# Patient Record
Sex: MALE | Race: WHITE | Employment: FULL TIME | URBAN - METROPOLITAN AREA
[De-identification: names, ages, dates, MRNs, and addresses within clinical notes are randomized per-mention and may not be internally consistent; named-entity substitution may affect disease eponyms.]

---

## 2023-02-11 ENCOUNTER — APPOINTMENT (OUTPATIENT)
Dept: GENERAL RADIOLOGY | Age: 68
DRG: 481 | End: 2023-02-11
Attending: EMERGENCY MEDICINE
Payer: COMMERCIAL

## 2023-02-11 ENCOUNTER — HOSPITAL ENCOUNTER (INPATIENT)
Age: 68
LOS: 3 days | Discharge: NURSING, REHAB OR LONG-TERM CARE | DRG: 481 | End: 2023-02-15
Attending: STUDENT IN AN ORGANIZED HEALTH CARE EDUCATION/TRAINING PROGRAM | Admitting: INTERNAL MEDICINE
Payer: COMMERCIAL

## 2023-02-11 DIAGNOSIS — S72.002A CLOSED FRACTURE OF LEFT HIP, INITIAL ENCOUNTER (HCC): Primary | ICD-10-CM

## 2023-02-11 LAB
ALBUMIN SERPL-MCNC: 4 G/DL (ref 3.5–5)
ALBUMIN/GLOB SERPL: 1.1 (ref 1.1–2.2)
ALP SERPL-CCNC: 113 U/L (ref 45–117)
ALT SERPL-CCNC: 59 U/L (ref 12–78)
ANION GAP SERPL CALC-SCNC: 7 MMOL/L (ref 5–15)
AST SERPL W P-5'-P-CCNC: 42 U/L (ref 15–37)
BASOPHILS # BLD: 0 K/UL (ref 0–0.1)
BASOPHILS NFR BLD: 1 % (ref 0–1)
BILIRUB SERPL-MCNC: 0.4 MG/DL (ref 0.2–1)
BUN SERPL-MCNC: 12 MG/DL (ref 6–20)
BUN/CREAT SERPL: 10 (ref 12–20)
CA-I BLD-MCNC: 9 MG/DL (ref 8.5–10.1)
CHLORIDE SERPL-SCNC: 106 MMOL/L (ref 97–108)
CO2 SERPL-SCNC: 26 MMOL/L (ref 21–32)
CREAT SERPL-MCNC: 1.19 MG/DL (ref 0.7–1.3)
DIFFERENTIAL METHOD BLD: ABNORMAL
EOSINOPHIL # BLD: 0.2 K/UL (ref 0–0.4)
EOSINOPHIL NFR BLD: 4 % (ref 0–7)
ERYTHROCYTE [DISTWIDTH] IN BLOOD BY AUTOMATED COUNT: 14.8 % (ref 11.5–14.5)
GLOBULIN SER CALC-MCNC: 3.5 G/DL (ref 2–4)
GLUCOSE SERPL-MCNC: 96 MG/DL (ref 65–100)
HCT VFR BLD AUTO: 46.9 % (ref 36.6–50.3)
HGB BLD-MCNC: 15.7 G/DL (ref 12.1–17)
IMM GRANULOCYTES # BLD AUTO: 0 K/UL (ref 0–0.04)
IMM GRANULOCYTES NFR BLD AUTO: 0 % (ref 0–0.5)
INR PPP: 1 (ref 0.9–1.1)
LYMPHOCYTES # BLD: 1.2 K/UL (ref 0.8–3.5)
LYMPHOCYTES NFR BLD: 19 % (ref 12–49)
MCH RBC QN AUTO: 32.6 PG (ref 26–34)
MCHC RBC AUTO-ENTMCNC: 33.5 G/DL (ref 30–36.5)
MCV RBC AUTO: 97.3 FL (ref 80–99)
MONOCYTES # BLD: 0.6 K/UL (ref 0–1)
MONOCYTES NFR BLD: 10 % (ref 5–13)
NEUTS SEG # BLD: 4.1 K/UL (ref 1.8–8)
NEUTS SEG NFR BLD: 66 % (ref 32–75)
NRBC # BLD: 0 K/UL (ref 0–0.01)
NRBC BLD-RTO: 0 PER 100 WBC
PLATELET # BLD AUTO: 204 K/UL (ref 150–400)
PMV BLD AUTO: 9.3 FL (ref 8.9–12.9)
POTASSIUM SERPL-SCNC: 3.9 MMOL/L (ref 3.5–5.1)
PROT SERPL-MCNC: 7.5 G/DL (ref 6.4–8.2)
PROTHROMBIN TIME: 13.6 SEC (ref 11.9–14.6)
RBC # BLD AUTO: 4.82 M/UL (ref 4.1–5.7)
SODIUM SERPL-SCNC: 139 MMOL/L (ref 136–145)
WBC # BLD AUTO: 6.2 K/UL (ref 4.1–11.1)

## 2023-02-11 PROCEDURE — 96361 HYDRATE IV INFUSION ADD-ON: CPT

## 2023-02-11 PROCEDURE — 74011250636 HC RX REV CODE- 250/636: Performed by: STUDENT IN AN ORGANIZED HEALTH CARE EDUCATION/TRAINING PROGRAM

## 2023-02-11 PROCEDURE — 93005 ELECTROCARDIOGRAM TRACING: CPT

## 2023-02-11 PROCEDURE — 85025 COMPLETE CBC W/AUTO DIFF WBC: CPT

## 2023-02-11 PROCEDURE — 73502 X-RAY EXAM HIP UNI 2-3 VIEWS: CPT

## 2023-02-11 PROCEDURE — 96375 TX/PRO/DX INJ NEW DRUG ADDON: CPT

## 2023-02-11 PROCEDURE — 36415 COLL VENOUS BLD VENIPUNCTURE: CPT

## 2023-02-11 PROCEDURE — 85610 PROTHROMBIN TIME: CPT

## 2023-02-11 PROCEDURE — 80053 COMPREHEN METABOLIC PANEL: CPT

## 2023-02-11 PROCEDURE — 99285 EMERGENCY DEPT VISIT HI MDM: CPT

## 2023-02-11 PROCEDURE — 86900 BLOOD TYPING SEROLOGIC ABO: CPT

## 2023-02-11 PROCEDURE — 96374 THER/PROPH/DIAG INJ IV PUSH: CPT

## 2023-02-11 RX ORDER — ONDANSETRON 2 MG/ML
4 INJECTION INTRAMUSCULAR; INTRAVENOUS
Status: COMPLETED | OUTPATIENT
Start: 2023-02-11 | End: 2023-02-11

## 2023-02-11 RX ORDER — FENTANYL CITRATE 50 UG/ML
50 INJECTION, SOLUTION INTRAMUSCULAR; INTRAVENOUS
Status: COMPLETED | OUTPATIENT
Start: 2023-02-11 | End: 2023-02-11

## 2023-02-11 RX ORDER — SODIUM CHLORIDE 9 MG/ML
1000 INJECTION, SOLUTION INTRAVENOUS CONTINUOUS
Status: DISCONTINUED | OUTPATIENT
Start: 2023-02-11 | End: 2023-02-12

## 2023-02-11 RX ORDER — MORPHINE SULFATE 4 MG/ML
4 INJECTION INTRAVENOUS ONCE
Status: COMPLETED | OUTPATIENT
Start: 2023-02-11 | End: 2023-02-11

## 2023-02-11 RX ADMIN — SODIUM CHLORIDE 1000 ML: 9 INJECTION, SOLUTION INTRAVENOUS at 22:16

## 2023-02-11 RX ADMIN — MORPHINE SULFATE 4 MG: 4 INJECTION, SOLUTION INTRAMUSCULAR; INTRAVENOUS at 23:13

## 2023-02-11 RX ADMIN — FENTANYL CITRATE 50 MCG: 0.05 INJECTION, SOLUTION INTRAMUSCULAR; INTRAVENOUS at 22:16

## 2023-02-11 RX ADMIN — ONDANSETRON 4 MG: 2 INJECTION INTRAMUSCULAR; INTRAVENOUS at 23:13

## 2023-02-12 ENCOUNTER — APPOINTMENT (OUTPATIENT)
Dept: GENERAL RADIOLOGY | Age: 68
DRG: 481 | End: 2023-02-12
Attending: ORTHOPAEDIC SURGERY
Payer: COMMERCIAL

## 2023-02-12 ENCOUNTER — APPOINTMENT (OUTPATIENT)
Dept: CT IMAGING | Age: 68
DRG: 481 | End: 2023-02-12
Attending: ORTHOPAEDIC SURGERY
Payer: COMMERCIAL

## 2023-02-12 ENCOUNTER — ANESTHESIA EVENT (OUTPATIENT)
Dept: SURGERY | Age: 68
DRG: 481 | End: 2023-02-12
Payer: COMMERCIAL

## 2023-02-12 ENCOUNTER — ANESTHESIA (OUTPATIENT)
Dept: SURGERY | Age: 68
DRG: 481 | End: 2023-02-12
Payer: COMMERCIAL

## 2023-02-12 PROBLEM — S72.90XA FEMUR FRACTURE (HCC): Status: ACTIVE | Noted: 2023-02-12

## 2023-02-12 LAB
ABO + RH BLD: NORMAL
BLOOD GROUP ANTIBODIES SERPL: NEGATIVE
SPECIMEN EXP DATE BLD: NORMAL

## 2023-02-12 PROCEDURE — 74011000250 HC RX REV CODE- 250: Performed by: ORTHOPAEDIC SURGERY

## 2023-02-12 PROCEDURE — 0QS704Z REPOSITION LEFT UPPER FEMUR WITH INTERNAL FIXATION DEVICE, OPEN APPROACH: ICD-10-PCS | Performed by: ORTHOPAEDIC SURGERY

## 2023-02-12 PROCEDURE — 74011250637 HC RX REV CODE- 250/637: Performed by: ORTHOPAEDIC SURGERY

## 2023-02-12 PROCEDURE — 74011000250 HC RX REV CODE- 250: Performed by: ANESTHESIOLOGY

## 2023-02-12 PROCEDURE — 74011250636 HC RX REV CODE- 250/636: Performed by: NURSE ANESTHETIST, CERTIFIED REGISTERED

## 2023-02-12 PROCEDURE — 77030031139 HC SUT VCRL2 J&J -A: Performed by: ORTHOPAEDIC SURGERY

## 2023-02-12 PROCEDURE — C1713 ANCHOR/SCREW BN/BN,TIS/BN: HCPCS | Performed by: ORTHOPAEDIC SURGERY

## 2023-02-12 PROCEDURE — 72192 CT PELVIS W/O DYE: CPT

## 2023-02-12 PROCEDURE — C1769 GUIDE WIRE: HCPCS | Performed by: ORTHOPAEDIC SURGERY

## 2023-02-12 PROCEDURE — 77030011264 HC ELECTRD BLD EXT COVD -A: Performed by: ORTHOPAEDIC SURGERY

## 2023-02-12 PROCEDURE — 73552 X-RAY EXAM OF FEMUR 2/>: CPT

## 2023-02-12 PROCEDURE — 76210000006 HC OR PH I REC 0.5 TO 1 HR: Performed by: ORTHOPAEDIC SURGERY

## 2023-02-12 PROCEDURE — 74011250637 HC RX REV CODE- 250/637: Performed by: NURSE PRACTITIONER

## 2023-02-12 PROCEDURE — 94640 AIRWAY INHALATION TREATMENT: CPT

## 2023-02-12 PROCEDURE — 2709999900 HC NON-CHARGEABLE SUPPLY: Performed by: ORTHOPAEDIC SURGERY

## 2023-02-12 PROCEDURE — 77030027467 HC RMR IM BIXCT DISP STRY -F: Performed by: ORTHOPAEDIC SURGERY

## 2023-02-12 PROCEDURE — 96375 TX/PRO/DX INJ NEW DRUG ADDON: CPT

## 2023-02-12 PROCEDURE — 76000 FLUOROSCOPY <1 HR PHYS/QHP: CPT

## 2023-02-12 PROCEDURE — 74011000250 HC RX REV CODE- 250: Performed by: INTERNAL MEDICINE

## 2023-02-12 PROCEDURE — 36415 COLL VENOUS BLD VENIPUNCTURE: CPT

## 2023-02-12 PROCEDURE — 74011250636 HC RX REV CODE- 250/636: Performed by: STUDENT IN AN ORGANIZED HEALTH CARE EDUCATION/TRAINING PROGRAM

## 2023-02-12 PROCEDURE — 77030040361 HC SLV COMPR DVT MDII -B: Performed by: ORTHOPAEDIC SURGERY

## 2023-02-12 PROCEDURE — 76010000131 HC OR TIME 2 TO 2.5 HR: Performed by: ORTHOPAEDIC SURGERY

## 2023-02-12 PROCEDURE — 77030016452 HC BIT DRL AO4 STRY -C: Performed by: ORTHOPAEDIC SURGERY

## 2023-02-12 PROCEDURE — 71045 X-RAY EXAM CHEST 1 VIEW: CPT

## 2023-02-12 PROCEDURE — 74011250637 HC RX REV CODE- 250/637: Performed by: INTERNAL MEDICINE

## 2023-02-12 PROCEDURE — 74011250636 HC RX REV CODE- 250/636: Performed by: ORTHOPAEDIC SURGERY

## 2023-02-12 PROCEDURE — 74011250636 HC RX REV CODE- 250/636: Performed by: INTERNAL MEDICINE

## 2023-02-12 PROCEDURE — 74011000250 HC RX REV CODE- 250: Performed by: NURSE ANESTHETIST, CERTIFIED REGISTERED

## 2023-02-12 PROCEDURE — 76060000035 HC ANESTHESIA 2 TO 2.5 HR: Performed by: ORTHOPAEDIC SURGERY

## 2023-02-12 PROCEDURE — 65270000029 HC RM PRIVATE

## 2023-02-12 PROCEDURE — 96376 TX/PRO/DX INJ SAME DRUG ADON: CPT

## 2023-02-12 DEVICE — LOCKING SCREW, FULLY THREADED: Type: IMPLANTABLE DEVICE | Site: FEMUR | Status: FUNCTIONAL

## 2023-02-12 DEVICE — LAG SCREW, TI
Type: IMPLANTABLE DEVICE | Site: FEMUR | Status: FUNCTIONAL
Brand: GAMMA

## 2023-02-12 DEVICE — LONG NAIL KIT R1.5, TI, LEFT
Type: IMPLANTABLE DEVICE | Site: FEMUR | Status: FUNCTIONAL
Brand: GAMMA

## 2023-02-12 RX ORDER — CEFAZOLIN SODIUM 1 G/3ML
INJECTION, POWDER, FOR SOLUTION INTRAMUSCULAR; INTRAVENOUS AS NEEDED
Status: DISCONTINUED | OUTPATIENT
Start: 2023-02-12 | End: 2023-02-12 | Stop reason: HOSPADM

## 2023-02-12 RX ORDER — SODIUM CHLORIDE 0.9 % (FLUSH) 0.9 %
5-40 SYRINGE (ML) INJECTION EVERY 8 HOURS
Status: DISCONTINUED | OUTPATIENT
Start: 2023-02-12 | End: 2023-02-15 | Stop reason: HOSPADM

## 2023-02-12 RX ORDER — SODIUM CHLORIDE 0.9 % (FLUSH) 0.9 %
5-40 SYRINGE (ML) INJECTION AS NEEDED
Status: DISCONTINUED | OUTPATIENT
Start: 2023-02-12 | End: 2023-02-12 | Stop reason: HOSPADM

## 2023-02-12 RX ORDER — MORPHINE SULFATE 2 MG/ML
2 INJECTION, SOLUTION INTRAMUSCULAR; INTRAVENOUS
Status: DISCONTINUED | OUTPATIENT
Start: 2023-02-12 | End: 2023-02-12

## 2023-02-12 RX ORDER — TRAMADOL HYDROCHLORIDE 50 MG/1
50 TABLET ORAL
Status: DISCONTINUED | OUTPATIENT
Start: 2023-02-12 | End: 2023-02-15 | Stop reason: HOSPADM

## 2023-02-12 RX ORDER — BUDESONIDE AND FORMOTEROL FUMARATE DIHYDRATE 160; 4.5 UG/1; UG/1
2 AEROSOL RESPIRATORY (INHALATION)
Status: DISCONTINUED | OUTPATIENT
Start: 2023-02-12 | End: 2023-02-15 | Stop reason: HOSPADM

## 2023-02-12 RX ORDER — DEXMEDETOMIDINE HYDROCHLORIDE 100 UG/ML
INJECTION, SOLUTION INTRAVENOUS AS NEEDED
Status: DISCONTINUED | OUTPATIENT
Start: 2023-02-12 | End: 2023-02-12

## 2023-02-12 RX ORDER — SODIUM CHLORIDE 9 MG/ML
125 INJECTION, SOLUTION INTRAVENOUS CONTINUOUS
Status: DISPENSED | OUTPATIENT
Start: 2023-02-12 | End: 2023-02-13

## 2023-02-12 RX ORDER — FENTANYL CITRATE 50 UG/ML
25 INJECTION, SOLUTION INTRAMUSCULAR; INTRAVENOUS
Status: DISCONTINUED | OUTPATIENT
Start: 2023-02-12 | End: 2023-02-12

## 2023-02-12 RX ORDER — BUPIVACAINE HYDROCHLORIDE 7.5 MG/ML
INJECTION, SOLUTION INTRASPINAL
Status: COMPLETED | OUTPATIENT
Start: 2023-02-12 | End: 2023-02-12

## 2023-02-12 RX ORDER — HYDROMORPHONE HYDROCHLORIDE 1 MG/ML
0.5 INJECTION, SOLUTION INTRAMUSCULAR; INTRAVENOUS; SUBCUTANEOUS
Status: DISCONTINUED | OUTPATIENT
Start: 2023-02-12 | End: 2023-02-12

## 2023-02-12 RX ORDER — PROPOFOL 10 MG/ML
INJECTION, EMULSION INTRAVENOUS
Status: DISCONTINUED | OUTPATIENT
Start: 2023-02-12 | End: 2023-02-12 | Stop reason: HOSPADM

## 2023-02-12 RX ORDER — SODIUM CHLORIDE 0.9 % (FLUSH) 0.9 %
5-40 SYRINGE (ML) INJECTION EVERY 8 HOURS
Status: DISCONTINUED | OUTPATIENT
Start: 2023-02-12 | End: 2023-02-12 | Stop reason: HOSPADM

## 2023-02-12 RX ORDER — FENTANYL CITRATE 50 UG/ML
100 INJECTION, SOLUTION INTRAMUSCULAR; INTRAVENOUS
Status: COMPLETED | OUTPATIENT
Start: 2023-02-12 | End: 2023-02-12

## 2023-02-12 RX ORDER — OXYCODONE HYDROCHLORIDE 5 MG/1
5 TABLET ORAL
Status: DISCONTINUED | OUTPATIENT
Start: 2023-02-12 | End: 2023-02-12

## 2023-02-12 RX ORDER — IPRATROPIUM BROMIDE AND ALBUTEROL SULFATE 2.5; .5 MG/3ML; MG/3ML
3 SOLUTION RESPIRATORY (INHALATION)
Status: DISCONTINUED | OUTPATIENT
Start: 2023-02-12 | End: 2023-02-15 | Stop reason: HOSPADM

## 2023-02-12 RX ORDER — FACIAL-BODY WIPES
10 EACH TOPICAL DAILY PRN
Status: DISCONTINUED | OUTPATIENT
Start: 2023-02-14 | End: 2023-02-15 | Stop reason: HOSPADM

## 2023-02-12 RX ORDER — ONDANSETRON 2 MG/ML
INJECTION INTRAMUSCULAR; INTRAVENOUS AS NEEDED
Status: DISCONTINUED | OUTPATIENT
Start: 2023-02-12 | End: 2023-02-12 | Stop reason: HOSPADM

## 2023-02-12 RX ORDER — ONDANSETRON 2 MG/ML
4 INJECTION INTRAMUSCULAR; INTRAVENOUS
Status: DISCONTINUED | OUTPATIENT
Start: 2023-02-12 | End: 2023-02-15 | Stop reason: HOSPADM

## 2023-02-12 RX ORDER — CELECOXIB 100 MG/1
100 CAPSULE ORAL 2 TIMES DAILY
Status: DISCONTINUED | OUTPATIENT
Start: 2023-02-13 | End: 2023-02-12

## 2023-02-12 RX ORDER — POLYETHYLENE GLYCOL 3350 17 G/17G
17 POWDER, FOR SOLUTION ORAL DAILY
Status: DISCONTINUED | OUTPATIENT
Start: 2023-02-13 | End: 2023-02-15 | Stop reason: HOSPADM

## 2023-02-12 RX ORDER — FENTANYL CITRATE 50 UG/ML
50 INJECTION, SOLUTION INTRAMUSCULAR; INTRAVENOUS AS NEEDED
Status: DISCONTINUED | OUTPATIENT
Start: 2023-02-12 | End: 2023-02-12 | Stop reason: HOSPADM

## 2023-02-12 RX ORDER — ASPIRIN 325 MG
325 TABLET, DELAYED RELEASE (ENTERIC COATED) ORAL 2 TIMES DAILY
Status: DISCONTINUED | OUTPATIENT
Start: 2023-02-12 | End: 2023-02-15 | Stop reason: HOSPADM

## 2023-02-12 RX ORDER — POLYETHYLENE GLYCOL 3350 17 G/17G
17 POWDER, FOR SOLUTION ORAL DAILY PRN
Status: DISCONTINUED | OUTPATIENT
Start: 2023-02-12 | End: 2023-02-15 | Stop reason: HOSPADM

## 2023-02-12 RX ORDER — ACETAMINOPHEN 325 MG/1
650 TABLET ORAL
Status: DISCONTINUED | OUTPATIENT
Start: 2023-02-12 | End: 2023-02-12

## 2023-02-12 RX ORDER — ACETAMINOPHEN 500 MG
1000 TABLET ORAL EVERY 6 HOURS
Status: DISCONTINUED | OUTPATIENT
Start: 2023-02-12 | End: 2023-02-12

## 2023-02-12 RX ORDER — OXYCODONE HYDROCHLORIDE 5 MG/1
2.5 TABLET ORAL
Status: DISCONTINUED | OUTPATIENT
Start: 2023-02-12 | End: 2023-02-15 | Stop reason: HOSPADM

## 2023-02-12 RX ORDER — NORETHINDRONE AND ETHINYL ESTRADIOL 0.5-0.035
5 KIT ORAL AS NEEDED
Status: DISCONTINUED | OUTPATIENT
Start: 2023-02-12 | End: 2023-02-12 | Stop reason: HOSPADM

## 2023-02-12 RX ORDER — MORPHINE SULFATE 4 MG/ML
4 INJECTION INTRAVENOUS
Status: DISPENSED | OUTPATIENT
Start: 2023-02-12 | End: 2023-02-15

## 2023-02-12 RX ORDER — FENTANYL CITRATE 50 UG/ML
INJECTION, SOLUTION INTRAMUSCULAR; INTRAVENOUS AS NEEDED
Status: DISCONTINUED | OUTPATIENT
Start: 2023-02-12 | End: 2023-02-12 | Stop reason: HOSPADM

## 2023-02-12 RX ORDER — LIDOCAINE HYDROCHLORIDE 10 MG/ML
0.1 INJECTION, SOLUTION EPIDURAL; INFILTRATION; INTRACAUDAL; PERINEURAL AS NEEDED
Status: DISCONTINUED | OUTPATIENT
Start: 2023-02-12 | End: 2023-02-12 | Stop reason: HOSPADM

## 2023-02-12 RX ORDER — DEXAMETHASONE SODIUM PHOSPHATE 4 MG/ML
INJECTION, SOLUTION INTRA-ARTICULAR; INTRALESIONAL; INTRAMUSCULAR; INTRAVENOUS; SOFT TISSUE AS NEEDED
Status: DISCONTINUED | OUTPATIENT
Start: 2023-02-12 | End: 2023-02-12 | Stop reason: HOSPADM

## 2023-02-12 RX ORDER — HYDROMORPHONE HYDROCHLORIDE 1 MG/ML
0.5 INJECTION, SOLUTION INTRAMUSCULAR; INTRAVENOUS; SUBCUTANEOUS ONCE
Status: COMPLETED | OUTPATIENT
Start: 2023-02-12 | End: 2023-02-12

## 2023-02-12 RX ORDER — BUDESONIDE, GLYCOPYRROLATE, AND FORMOTEROL FUMARATE 160; 9; 4.8 UG/1; UG/1; UG/1
2 AEROSOL, METERED RESPIRATORY (INHALATION) 2 TIMES DAILY
COMMUNITY
Start: 2022-08-10

## 2023-02-12 RX ORDER — OXYCODONE HYDROCHLORIDE 10 MG/1
10 TABLET ORAL
Status: DISCONTINUED | OUTPATIENT
Start: 2023-02-12 | End: 2023-02-12

## 2023-02-12 RX ORDER — SODIUM CHLORIDE 0.9 % (FLUSH) 0.9 %
5-40 SYRINGE (ML) INJECTION AS NEEDED
Status: DISCONTINUED | OUTPATIENT
Start: 2023-02-12 | End: 2023-02-15 | Stop reason: HOSPADM

## 2023-02-12 RX ORDER — CELECOXIB 200 MG/1
200 CAPSULE ORAL 2 TIMES DAILY
Status: COMPLETED | OUTPATIENT
Start: 2023-02-12 | End: 2023-02-14

## 2023-02-12 RX ORDER — SODIUM CHLORIDE 0.9 % (FLUSH) 0.9 %
5-40 SYRINGE (ML) INJECTION AS NEEDED
Status: DISCONTINUED | OUTPATIENT
Start: 2023-02-12 | End: 2023-02-12

## 2023-02-12 RX ORDER — ONDANSETRON 4 MG/1
4 TABLET, ORALLY DISINTEGRATING ORAL
Status: DISCONTINUED | OUTPATIENT
Start: 2023-02-12 | End: 2023-02-15 | Stop reason: HOSPADM

## 2023-02-12 RX ORDER — DIPHENHYDRAMINE HYDROCHLORIDE 50 MG/ML
12.5 INJECTION, SOLUTION INTRAMUSCULAR; INTRAVENOUS AS NEEDED
Status: DISCONTINUED | OUTPATIENT
Start: 2023-02-12 | End: 2023-02-12 | Stop reason: HOSPADM

## 2023-02-12 RX ORDER — MIDAZOLAM HYDROCHLORIDE 1 MG/ML
INJECTION, SOLUTION INTRAMUSCULAR; INTRAVENOUS AS NEEDED
Status: DISCONTINUED | OUTPATIENT
Start: 2023-02-12 | End: 2023-02-12 | Stop reason: HOSPADM

## 2023-02-12 RX ORDER — ACETAMINOPHEN 650 MG/1
650 SUPPOSITORY RECTAL
Status: DISCONTINUED | OUTPATIENT
Start: 2023-02-12 | End: 2023-02-12

## 2023-02-12 RX ORDER — SODIUM CHLORIDE 0.9 % (FLUSH) 0.9 %
5-40 SYRINGE (ML) INJECTION EVERY 8 HOURS
Status: DISCONTINUED | OUTPATIENT
Start: 2023-02-12 | End: 2023-02-12

## 2023-02-12 RX ORDER — MORPHINE SULFATE 2 MG/ML
1 INJECTION, SOLUTION INTRAMUSCULAR; INTRAVENOUS
Status: ACTIVE | OUTPATIENT
Start: 2023-02-12 | End: 2023-02-13

## 2023-02-12 RX ORDER — SODIUM CHLORIDE 9 MG/ML
100 INJECTION, SOLUTION INTRAVENOUS CONTINUOUS
Status: DISCONTINUED | OUTPATIENT
Start: 2023-02-12 | End: 2023-02-12

## 2023-02-12 RX ORDER — ACETAMINOPHEN 500 MG
1000 TABLET ORAL EVERY 6 HOURS
Status: DISCONTINUED | OUTPATIENT
Start: 2023-02-12 | End: 2023-02-15 | Stop reason: HOSPADM

## 2023-02-12 RX ORDER — HYDROMORPHONE HYDROCHLORIDE 1 MG/ML
0.5 INJECTION, SOLUTION INTRAMUSCULAR; INTRAVENOUS; SUBCUTANEOUS
Status: DISCONTINUED | OUTPATIENT
Start: 2023-02-12 | End: 2023-02-12 | Stop reason: HOSPADM

## 2023-02-12 RX ORDER — FENTANYL CITRATE 50 UG/ML
50 INJECTION, SOLUTION INTRAMUSCULAR; INTRAVENOUS
Status: DISCONTINUED | OUTPATIENT
Start: 2023-02-12 | End: 2023-02-12

## 2023-02-12 RX ORDER — AMOXICILLIN 250 MG
1 CAPSULE ORAL 2 TIMES DAILY
Status: DISCONTINUED | OUTPATIENT
Start: 2023-02-12 | End: 2023-02-15 | Stop reason: HOSPADM

## 2023-02-12 RX ORDER — FENTANYL CITRATE 50 UG/ML
50 INJECTION, SOLUTION INTRAMUSCULAR; INTRAVENOUS
Status: DISCONTINUED | OUTPATIENT
Start: 2023-02-12 | End: 2023-02-12 | Stop reason: HOSPADM

## 2023-02-12 RX ORDER — NALOXONE HYDROCHLORIDE 0.4 MG/ML
0.4 INJECTION, SOLUTION INTRAMUSCULAR; INTRAVENOUS; SUBCUTANEOUS AS NEEDED
Status: DISCONTINUED | OUTPATIENT
Start: 2023-02-12 | End: 2023-02-15 | Stop reason: HOSPADM

## 2023-02-12 RX ORDER — ONDANSETRON 2 MG/ML
4 INJECTION INTRAMUSCULAR; INTRAVENOUS AS NEEDED
Status: DISCONTINUED | OUTPATIENT
Start: 2023-02-12 | End: 2023-02-12 | Stop reason: HOSPADM

## 2023-02-12 RX ORDER — SODIUM CHLORIDE, SODIUM LACTATE, POTASSIUM CHLORIDE, CALCIUM CHLORIDE 600; 310; 30; 20 MG/100ML; MG/100ML; MG/100ML; MG/100ML
INJECTION, SOLUTION INTRAVENOUS
Status: DISCONTINUED | OUTPATIENT
Start: 2023-02-12 | End: 2023-02-12 | Stop reason: HOSPADM

## 2023-02-12 RX ORDER — ONDANSETRON 2 MG/ML
4 INJECTION INTRAMUSCULAR; INTRAVENOUS
Status: ACTIVE | OUTPATIENT
Start: 2023-02-12 | End: 2023-02-13

## 2023-02-12 RX ADMIN — CELECOXIB 200 MG: 200 CAPSULE ORAL at 21:02

## 2023-02-12 RX ADMIN — PROPOFOL 100 MCG/KG/MIN: 10 INJECTION, EMULSION INTRAVENOUS at 15:30

## 2023-02-12 RX ADMIN — MORPHINE SULFATE 4 MG: 4 INJECTION, SOLUTION INTRAMUSCULAR; INTRAVENOUS at 11:48

## 2023-02-12 RX ADMIN — HYDROMORPHONE HYDROCHLORIDE 0.5 MG: 1 INJECTION, SOLUTION INTRAMUSCULAR; INTRAVENOUS; SUBCUTANEOUS at 03:09

## 2023-02-12 RX ADMIN — IPRATROPIUM BROMIDE AND ALBUTEROL SULFATE 3 ML: 2.5; .5 SOLUTION RESPIRATORY (INHALATION) at 09:39

## 2023-02-12 RX ADMIN — FENTANYL CITRATE 100 MCG: 50 INJECTION, SOLUTION INTRAMUSCULAR; INTRAVENOUS at 00:50

## 2023-02-12 RX ADMIN — ONDANSETRON 4 MG: 2 INJECTION INTRAMUSCULAR; INTRAVENOUS at 16:05

## 2023-02-12 RX ADMIN — SODIUM CHLORIDE, PRESERVATIVE FREE 10 ML: 5 INJECTION INTRAVENOUS at 08:31

## 2023-02-12 RX ADMIN — ACETAMINOPHEN 1000 MG: 500 TABLET ORAL at 20:02

## 2023-02-12 RX ADMIN — SODIUM CHLORIDE, PRESERVATIVE FREE 10 ML: 5 INJECTION INTRAVENOUS at 22:15

## 2023-02-12 RX ADMIN — OXYCODONE 2.5 MG: 5 TABLET ORAL at 21:02

## 2023-02-12 RX ADMIN — SENNOSIDES AND DOCUSATE SODIUM 1 TABLET: 50; 8.6 TABLET ORAL at 21:02

## 2023-02-12 RX ADMIN — CEFAZOLIN SODIUM 2 G: 1 INJECTION, POWDER, FOR SOLUTION INTRAMUSCULAR; INTRAVENOUS at 15:46

## 2023-02-12 RX ADMIN — PROPOFOL 30 MG: 10 INJECTION, EMULSION INTRAVENOUS at 15:20

## 2023-02-12 RX ADMIN — SODIUM CHLORIDE 50 MCG/MIN: 9 INJECTION, SOLUTION INTRAVENOUS at 16:02

## 2023-02-12 RX ADMIN — BUDESONIDE AND FORMOTEROL FUMARATE DIHYDRATE 2 PUFF: 160; 4.5 AEROSOL RESPIRATORY (INHALATION) at 19:41

## 2023-02-12 RX ADMIN — ACETAMINOPHEN 1000 MG: 500 TABLET ORAL at 11:48

## 2023-02-12 RX ADMIN — MIDAZOLAM HYDROCHLORIDE 2 MG: 2 INJECTION, SOLUTION INTRAMUSCULAR; INTRAVENOUS at 15:12

## 2023-02-12 RX ADMIN — ASPIRIN 325 MG: 325 TABLET, COATED ORAL at 21:01

## 2023-02-12 RX ADMIN — SODIUM CHLORIDE 125 ML/HR: 9 INJECTION, SOLUTION INTRAVENOUS at 19:56

## 2023-02-12 RX ADMIN — SODIUM CHLORIDE, POTASSIUM CHLORIDE, SODIUM LACTATE AND CALCIUM CHLORIDE: 600; 310; 30; 20 INJECTION, SOLUTION INTRAVENOUS at 15:12

## 2023-02-12 RX ADMIN — BUDESONIDE AND FORMOTEROL FUMARATE DIHYDRATE 2 PUFF: 160; 4.5 AEROSOL RESPIRATORY (INHALATION) at 09:42

## 2023-02-12 RX ADMIN — FENTANYL CITRATE 50 MCG: 50 INJECTION INTRAMUSCULAR; INTRAVENOUS at 05:08

## 2023-02-12 RX ADMIN — ACETAMINOPHEN 1000 MG: 500 TABLET ORAL at 23:26

## 2023-02-12 RX ADMIN — BUPIVACAINE HYDROCHLORIDE 15 MG: 7.5 INJECTION, SOLUTION INTRASPINAL at 15:20

## 2023-02-12 RX ADMIN — SODIUM CHLORIDE 100 ML/HR: 9 INJECTION, SOLUTION INTRAVENOUS at 00:50

## 2023-02-12 RX ADMIN — PHENYLEPHRINE HYDROCHLORIDE 100 MCG: 10 INJECTION INTRAVENOUS at 15:57

## 2023-02-12 RX ADMIN — FENTANYL CITRATE 50 MCG: 50 INJECTION, SOLUTION INTRAMUSCULAR; INTRAVENOUS at 15:12

## 2023-02-12 RX ADMIN — SODIUM CHLORIDE, PRESERVATIVE FREE 10 ML: 5 INJECTION INTRAVENOUS at 05:45

## 2023-02-12 RX ADMIN — OXYCODONE HYDROCHLORIDE 10 MG: 10 TABLET ORAL at 09:17

## 2023-02-12 RX ADMIN — CELECOXIB 200 MG: 200 CAPSULE ORAL at 10:00

## 2023-02-12 RX ADMIN — DEXAMETHASONE SODIUM PHOSPHATE 4 MG: 4 INJECTION, SOLUTION INTRA-ARTICULAR; INTRALESIONAL; INTRAMUSCULAR; INTRAVENOUS; SOFT TISSUE at 16:05

## 2023-02-12 RX ADMIN — MORPHINE SULFATE 4 MG: 4 INJECTION, SOLUTION INTRAMUSCULAR; INTRAVENOUS at 08:29

## 2023-02-12 RX ADMIN — SODIUM CHLORIDE, PRESERVATIVE FREE 10 ML: 5 INJECTION INTRAVENOUS at 19:58

## 2023-02-12 RX ADMIN — FENTANYL CITRATE 50 MCG: 50 INJECTION, SOLUTION INTRAMUSCULAR; INTRAVENOUS at 15:19

## 2023-02-12 NOTE — ED PROVIDER NOTES
[id number]  EMERGENCY DEPARTMENT ENCOUNTER NOTE    Date: 2/11/2023  Patient Name: Mare Pennington    History of Presenting Illness     Chief Complaint   Patient presents with    Fall    Hip Pain       History obtained from: Patient    HPI: Mare Pennington, 74 y.o. male with no known past medical history or medications presents for follow-up. The patient was walking in his house when he slipped and fell sideways landing on his left hip without any head trauma. No loss of consciousness. He has localized pain over the left hip with swelling, deformity, and a shortened and externally rotated lower extremity. No history of fractures. He has severe 10/10 pain over the left hip area. No numbness or weakness in the left lower extremity. Has full range of motion of the knee and ankle with no change in skin color, no cyanosis, no pale extremity, and normal temperature and perfusion. On arrival, he had normal pulses in the lower extremities. Medical History   I reviewed the medical, surgical, family, and social history, as well as allergies:    PCP: Arely Mark MD    Past Medical History:  No past medical history on file. Past Surgical History:  No past surgical history on file. Current Outpatient Medications:  No current outpatient medications   Family History:  No family history on file. Social History: Allergies:  No Known Allergies    Review of Systems     Review of Systems  Negative: Positives and pertinent negatives as per HPI. All other systems were reviewed and are negative. Physical Exam & Vital Signs   Vital Signs - I reviewed the patient's vital signs.     Patient Vitals for the past 12 hrs:   Temp Pulse Resp BP SpO2   02/12/23 0158 -- 100 15 121/68 97 %   02/12/23 0131 -- 96 17 123/81 95 %   02/12/23 0030 -- 90 20 113/69 96 %   02/11/23 2332 -- 78 22 119/77 97 %   02/11/23 2230 -- 90 18 113/74 97 %   02/11/23 2206 -- -- -- -- 90 %   02/11/23 2157 97.8 °F (36.6 °C) 81 20 (!) 90/53 --     Physical Exam:    GENERAL: awake, alert, cooperative, not in distress  HEENT:  * Pupils equal, EOMI  * Head atraumatic  CV:  * audible heart sounds  * warm and perfused extremities bilaterally  PULMONARY: Good air movement, no wheezes, no crackles  ABDOMEN/: soft, no distension, no guarding, no abdominal tenderness  EXTREMITIES/BACK: warm and perfused noted left-sided hematoma over the proximal lateral thigh with tenderness over the hip with a shortened and externally rotated left lower extremity with intact DP and PT pulses, normal cap refill, and normal temperature with no tenderness over the distal femur, knee, leg, or ankle/foot. Normal sensation all dermatomes. No overlying lacerations or abrasions. SKIN: no rashes or signs of trauma  NEURO:  * Speech clear  * Moves U&LE to command    Medical Decision Making     Patient is a 74 y.o. male presenting for fall. Vitals reveal no significant abnormalities and physical exam reveals  L hip deformity with normal neurovasc exam . EKG showed no significant abnormalities. Based on the history, physical exam, risk factors, and vital signs, differential includes: Hip fracture, dislocation. No evidence of neurovascular compromise. The patient will be given pain medicines, preop labs sent, will be kept NPO. We will start IV fluid maintenance. We will contact Ortho. Will admit. .    See ED Course and Reassessment for evaluation and discussion. Consultant Discussions/Recs: None  Records Reviewed: Nursing Notes  Social Determinants of health affecting management: None    ED Course & Reassessment     ED Course:     ED Course as of 02/12/23 0259   Sat Feb 11, 2023   2303 CBC does not show any evidence of acute process. Leukocytosis not present to suggest infection. Hemoglobin not suggestive of acute anemia. No significant electrolyte derangements. Creatinine is not elevated more than baseline range making TETO unlikely. No significant transaminitis noted. Normal bilirubin. INR and PT are within normal limits. [SS]   2321 XR: Comminuted and displaced intertrochanteric fracture. [SS]   Sun Feb 12, 2023   0038 Orthopedics contacted. [SS]      ED Course User Index  [SS] Lubna Rashid MD       Reassessment:    Will admit. Diagnosis     Clinical Impression:   1. Closed fracture of left hip, initial encounter [facility])        Final Disposition     Admission: [facility]    After completion of ED workup and discussion of results and diagnoses, patient was admitted to the hospital. Case was discussed with admitting provider. Procedures, Critical Care, & Clinical Tools   Performed by: Isiah Cruz MD  Procedures     EKG interpretation (Preliminary interpretation by me):  Rhythm: normal sinus rhythm; and regular . Rate (approx.): 81. Axis: normal;  CA interval: normal;  QRS interval: normal ;  ST/T wave: normal;  Other findings: normal.      Results, Consults, Medications     Consults:  IP CONSULT TO ORTHOPEDIC SURGERY   Labs:  Recent Results (from the past 12 hour(s))   PROTHROMBIN TIME + INR    Collection Time: 02/11/23 10:13 PM   Result Value Ref Range    Prothrombin time 13.6 11.9 - 14.6 sec    INR 1.0 0.9 - 1.1     CBC WITH AUTOMATED DIFF    Collection Time: 02/11/23 10:13 PM   Result Value Ref Range    WBC 6.2 4.1 - 11.1 K/uL    RBC 4.82 4.10 - 5.70 M/uL    HGB 15.7 12.1 - 17.0 g/dL    HCT 46.9 36.6 - 50.3 %    MCV 97.3 80.0 - 99.0 FL    MCH 32.6 26.0 - 34.0 PG    MCHC 33.5 30.0 - 36.5 g/dL    RDW 14.8 (H) 11.5 - 14.5 %    PLATELET 961 858 - 585 K/uL    MPV 9.3 8.9 - 12.9 FL    NRBC 0.0 0.0  WBC    ABSOLUTE NRBC 0.00 0.00 - 0.01 K/uL    NEUTROPHILS 66 32 - 75 %    LYMPHOCYTES 19 12 - 49 %    MONOCYTES 10 5 - 13 %    EOSINOPHILS 4 0 - 7 %    BASOPHILS 1 0 - 1 %    IMMATURE GRANULOCYTES 0 0 - 0.5 %    ABS. NEUTROPHILS 4.1 1.8 - 8.0 K/UL    ABS. LYMPHOCYTES 1.2 0.8 - 3.5 K/UL    ABS.  MONOCYTES 0.6 0.0 - 1.0 K/UL ABS. EOSINOPHILS 0.2 0.0 - 0.4 K/UL    ABS. BASOPHILS 0.0 0.0 - 0.1 K/UL    ABS. IMM. GRANS. 0.0 0.00 - 0.04 K/UL    DF AUTOMATED     METABOLIC PANEL, COMPREHENSIVE    Collection Time: 02/11/23 10:13 PM   Result Value Ref Range    Sodium 139 136 - 145 mmol/L    Potassium 3.9 3.5 - 5.1 mmol/L    Chloride 106 97 - 108 mmol/L    CO2 26 21 - 32 mmol/L    Anion gap 7 5 - 15 mmol/L    Glucose 96 65 - 100 mg/dL    BUN 12 6 - 20 mg/dL    Creatinine 1.19 0.70 - 1.30 mg/dL    BUN/Creatinine ratio 10 (L) 12 - 20      eGFR >60 >60 ml/min/1.73m2    Calcium 9.0 8.5 - 10.1 mg/dL    Bilirubin, total 0.4 0.2 - 1.0 mg/dL    AST (SGOT) 42 (H) 15 - 37 U/L    ALT (SGPT) 59 12 - 78 U/L    Alk. phosphatase 113 45 - 117 U/L    Protein, total 7.5 6.4 - 8.2 g/dL    Albumin 4.0 3.5 - 5.0 g/dL    Globulin 3.5 2.0 - 4.0 g/dL    A-G Ratio 1.1 1.1 - 2.2     TYPE & SCREEN    Collection Time: 02/11/23 11:16 PM   Result Value Ref Range    Crossmatch Expiration 02/14/2023,2359     ABO/Rh(D) A Negative     Antibody screen Negative      Radiologic Studies:  CT Results  (Last 48 hours)      None          CXR Results  (Last 48 hours)      None          Medications ordered:  Medications   0.9% sodium chloride infusion 1,000 mL (0 mL IntraVENous IV Completed 2/12/23 0055)   0.9% sodium chloride infusion (100 mL/hr IntraVENous New Bag 2/12/23 0050)   HYDROmorphone (DILAUDID) syringe 0.5 mg (has no administration in time range)   fentaNYL citrate (PF) injection 50 mcg (50 mcg IntraVENous Given 2/11/23 2216)   morphine injection 4 mg (4 mg IntraVENous Given 2/11/23 2313)   ondansetron (ZOFRAN) injection 4 mg (4 mg IntraVENous Given 2/11/23 2313)   fentaNYL citrate (PF) injection 100 mcg (100 mcg IntraVENous Given 2/12/23 0050)       Documentation Comments   - I am the first and primary provider for this patient and am the primary provider of record. - Initial assessment performed.  The patients presenting problems have been discussed, and the staff are in agreement with the care plan formulated and outlined with them. I have encouraged them to ask questions as they arise throughout their visit. - Available medical records, nursing notes, old EKGs, and EMS run sheets (if patient was EMS transported) were reviewed    Please note that this dictation was completed with [employer], the computer voice recognition software. Quite often unanticipated grammatical, syntax, homophones, and other interpretive errors are inadvertently transcribed by the computer software. Please disregard these errors. Please excuse any errors that have escaped final proofreading.

## 2023-02-12 NOTE — OP NOTES
Operative Note    Patient: Ren Padilla  YOB: 1955  MRN: [id number]    Date of Procedure: 2/12/2023     Pre-Op Diagnosis: 1)  Intertrochanteric fracture LEFT hip, transverse, comminuted with greater trochanteric and posterior comminution  2) COPD  3) history of left hemiplegia, with residual left lower extremity monoparesis    Post-Op Diagnosis: Same as preoperative diagnosis. Procedure(s):  OPEN REDUCTION INTERNAL FIXATION OF LEFT HIP    Surgeon(s):  Alvino Beckford MD    Surgical Assistant: Surg Asst-1: John Evans, and April    Anesthesia: Spinal by Dr. Fátima Neal and iJnny Elam    Estimated Blood Loss (mL):  139 ml    Complications: None    Specimens: * No specimens in log *     Implants:   From [employer] gamma nail system  1) 130° x 11 mm x 440 mm gamma nail  2) 110 mm sliding hip screw  3) setscrew placed in dynamic mode  4) Two static distal locking screws (50 mm and 47.5 mm)    Implant Name Type Inv. Item Serial No.  Lot No. LRB No. Used Action   NAIL IM L440MM OD11MM 130DEG LNG TI L FEM R1.5 GAMMA3 - SNA  NAIL IM L440MM OD11MM 130DEG LNG TI L FEM R1.5 GAMMA3 NA [email or web address] [id number] Left 1 Implanted   SCR BNE LAG GAMMA 3 [id number] -- TI STRL - SNA  SCR BNE LAG GAMMA 3 [id number] -- TI STRL NA [email or web address] [id number] Left 1 Implanted   SCR BNE LCK T2 FT 5X50MM TI -- STRL - SNA  SCR BNE LCK T2 FT 5X50MM TI -- STRL NA [email or web address] [id number] Left 1 Implanted   SCR BNE LCK T2 FT 5X47. 5MM TI -- STRL - SNA  SCR BNE LCK T2 FT 5X47. 5MM TI -- STRL NA [email or web address] [id number] Left 1 Implanted     Drains: * No LDAs found *    Findings: Stable near anatomic reduction obtained, with satisfactory hardware placement and fixation. Detailed Description of Procedure: The patient and operative site were identified in the preoperative holding area. After induction of anesthesia the patient was positioned supine on the fracture table. Intravenous cefazolin were administered. Fluoroscopy was positioned appropriately. Fracture reduction was obtained with traction, and manipulation with rotation. Satisfactory alignment was noted. Standard prepping and draping of the LEFT hip was performed into a surgical field. After timeout was called, I used biplanar fluoroscopy to localize the tip of the greater trochanter, and made a short incision proximal and lateral to it. The deep fascia was divided in line with the incision. The greater trochanter was localized. A cannulated trochanteric awl was now introduced into the proximal femur biplanar fluoroscopic assistance, and accurately optimizing the entry point. The guidewire was inserted into the femoral canal, and the end was confirmed to be at the distal femoral physis healed scar. The required length of the intramedullary long gamma nail was estimated at 460 mm. However, the longest nail carried routinely by the [facility] team is 440 mm, and we chose to use that implant. A one-step 15 mm reamer was passed to the lesser trochanter. Flexible intramedullary reaming was now performed over the intramedullary guidewire. The final reamer was 12.5 mm, allowing the use of an 11 mm nail. The chosen intramedullary device was now passed over the guidewire and fine-tuned in position. The long intramedullary guidewire was removed. The short tip threaded guidewire was inserted into the femoral head, and accurate positioning was performed. Cannulated reaming was performed, followed by insertion of the 110 mm sliding screw. The traction was released. The setscrew was now inserted in the dynamic mode. The insertion handle was disengaged and removed. This was followed by standard distal static locking of the implant with a freehand technique, using two screws. Fluoroscopic images were obtained and saved, confirming near anatomic fracture reduction as well as satisfactory implant placement.   The wound was thoroughly irrigated and closure was performed in layers. Subcutaneous and skin closure was performed by first assistant Ms. Anderson, along with infiltration of soft tissues with local anesthetic. Sterile dressings were applied. The patient tolerated the procedure well and was transferred to the recovery room in stable condition. Postoperatively, the patient will be allowed weightbearing as tolerated, with a standard hip fracture rehabilitation protocol.     Electronically Signed by Zakiya Solomon MD on 2/12/2023 at 5:11 PM

## 2023-02-12 NOTE — ANESTHESIA PREPROCEDURE EVALUATION
Relevant Problems   No relevant active problems       Anesthetic History   No history of anesthetic complications            Review of Systems / Medical History  Patient summary reviewed, nursing notes reviewed and pertinent labs reviewed    Pulmonary    COPD            Comments: Home O2 @ 2 l/m at night. Snoring. Neuro/Psych              Cardiovascular  Within defined limits                Exercise tolerance: >4 METS     GI/Hepatic/Renal  Within defined limits              Endo/Other             Other Findings   Comments: X-RAY (2-11-23) IMPRESSION   Comminuted and displaced left intertrochanteric fracture. Physical Exam    Airway  Mallampati: I  TM Distance: > 6 cm  Neck ROM: normal range of motion   Mouth opening: Normal    Comments: Iraheta. Cardiovascular    Rhythm: regular  Rate: normal         Dental      Comments: No teeth in lower jaw.     Pulmonary  Breath sounds clear to auscultation               Abdominal  GI exam deferred       Other Findings            Anesthetic Plan    ASA: 3, emergent  Anesthesia type: spinal    Monitoring Plan: Continuous noninvasive hemodynamic monitoring        Anesthetic plan and risks discussed with: Patient and Family

## 2023-02-12 NOTE — PROGRESS NOTES
Pt refusing full skin assessment at this time due to LLE injury, Pt denies any skin injuries on backside.

## 2023-02-12 NOTE — ANESTHESIA POSTPROCEDURE EVALUATION
Procedure(s):  OPEN REDUCTION INTERNAL FIXATION OF LEFT HIP.    spinal    Anesthesia Post Evaluation        Patient location during evaluation: PACU  Patient participation: complete - patient participated  Level of consciousness: awake  Pain score: 0  Pain management: adequate  Airway patency: patent  Anesthetic complications: no  Cardiovascular status: acceptable  Respiratory status: acceptable  Hydration status: acceptable  Post anesthesia nausea and vomiting:  controlled  Final Post Anesthesia Temperature Assessment:  Normothermia (36.0-37.5 degrees C)      INITIAL Post-op Vital signs:   Vitals Value Taken Time   /69 02/12/23 1800   Temp 36.6 °C (97.8 °F) 02/12/23 1800   Pulse 102 02/12/23 1800   Resp 18 02/12/23 1800   SpO2 99 % 02/12/23 1800

## 2023-02-12 NOTE — CONSULTS
Consult Date: 2/12/2023    IP CONSULT TO ORTHOPEDIC SURGERY  Consult performed by: Breezy Jones MD  Consult ordered by: Elliott Sales MD  Reason for consult: Left hip fracture  Assessment/Recommendations:     IMPRESSION:    1) Complex comminuted transverse intertrochanteric fracture of left proximal femur  2) Chronic left lower extremity monoparesis  3) COPD  4) Remote history of left hemiplegia    RECOMMENDATIONS/PLAN:    I had a detailed discussion with the patient about the nature of his left hip injury, its natural history and treatment options. I have recommended and offered open repair of the proximal femur fracture with internal fixation, with the goal of expediting early mobility and rehabilitation, and mitigating the complications of prolonged immobility and recumbency. Potential risks of the injury and its treatment were discussed and acknowledged, including but not limited to infection, neurovascular injury, blood loss and transfusions, thromboembolism, leg length discrepancy, delayed union, nonunion, malunion, and possible need for further surgery in the future, among others. The patient appeared to understand the issues discussed and wishes to proceed with the recommended treatment plan. Surgery has tentatively been scheduled for today, pending or availability and medical clearance. Karma Ferrera, 74 y.o. male with no known past medical history or medications presents for follow-up. The patient was walking in his house when he slipped and fell sideways landing on his left hip without any head trauma. No loss of consciousness. He has localized pain over the left hip with swelling, deformity, and a shortened and externally rotated lower extremity. No history of fractures. He has severe 10/10 pain over the left hip area. No numbness or weakness in the left lower extremity.   Has full range of motion of the knee and ankle with no change in skin color, no cyanosis, no pale extremity, and normal temperature and perfusion. On arrival, he had normal pulses in the lower extremities. ED evaluation confirmed isolated orthopedic injury in the form of a left peritrochanteric hip fracture. The patient was admitted for medical evaluation and clearance, and subsequent definitive orthopedic management. On my interview and examination, the patient was a good historian, and corroborated elements of history as noted above. He confirmed that he has a history of left hemiplegia several years ago, related to some form of intracranial surgery. He showed good functional recovery over the last years, but continues to have poor coordination and lack of control in his left lower extremity. No past medical history on file. No past surgical history on file. No family history on file.    Social History     Tobacco Use    Smoking status: Not on file    Smokeless tobacco: Not on file   Substance Use Topics    Alcohol use: Not on file       Current Facility-Administered Medications   Medication Dose Route Frequency Provider Last Rate Last Admin    0.9% sodium chloride infusion  100 mL/hr IntraVENous CONTINUOUS Durenda HendersonKeyana  mL/hr at 02/12/23 0050 100 mL/hr at 02/12/23 0050    sodium chloride (NS) flush 5-40 mL  5-40 mL IntraVENous Q8H Keyana No MD   10 mL at 02/12/23 0831    sodium chloride (NS) flush 5-40 mL  5-40 mL IntraVENous PRN Elliott Sales MD        polyethylene glycol (MIRALAX) packet 17 g  17 g Oral DAILY PRN Elliott Sales MD        ondansetron (ZOFRAN ODT) tablet 4 mg  4 mg Oral Q8H PRN Elliott Sales MD        Or    ondansetron (ZOFRAN) injection 4 mg  4 mg IntraVENous Q6H PRN Elliott Sales MD        albuterol-ipratropium (DUO-NEB) 2.5 MG-0.5 MG/3 ML  3 mL Nebulization Q6H PRN Elliott Sales MD   3 mL at 02/12/23 0939    budesonide-formoteroL (SYMBICORT) 160-4.5 mcg/actuation HFA inhaler 2 Puff  2 Puff Inhalation BID RT Elliott Sales MD   2 Puff at 02/12/23 0789 tiotropium bromide (SPIRIVA RESPIMAT) 2.5 mcg /actuation  2 Puff Inhalation DAILY Raj No MD        acetaminophen (TYLENOL) tablet 1,000 mg  1,000 mg Oral Q6H Jenn Balderas MD   1,000 mg at 02/12/23 1148    celecoxib (CELEBREX) capsule 200 mg  200 mg Oral BID Jenn Balderas MD   200 mg at 02/12/23 1000    oxyCODONE IR (ROXICODONE) tablet 5 mg  5 mg Oral Q4H PRN Jenn Balderas MD        morphine injection 4 mg  4 mg IntraVENous Q3H PRN Jenn Balderas MD   4 mg at 02/12/23 1148    morphine injection 2 mg  2 mg IntraVENous Q4H PRN Martin Craze, NP        oxyCODONE IR (ROXICODONE) tablet 5 mg  5 mg Oral Q4H PRN Martin Craze, NP        oxyCODONE IR (ROXICODONE) tablet 10 mg  10 mg Oral Q4H PRN Martin Craze, NP   10 mg at 02/12/23 0917    0.9% sodium chloride infusion 1,000 mL  1,000 mL IntraVENous CONTINUOUS Serena Moreno MD   IV Completed at 02/12/23 0055        Review of Systems  Negative: Positives and pertinent negatives as per HPI. All other systems were reviewed and are negative. Objective     Vital signs for last 24 hours:  Visit Vitals  /82 (BP 1 Location: Left upper arm, BP Patient Position: At rest)   Pulse 80   Temp 98.5 °F (36.9 °C)   Resp 22   Ht 6' 4\" (1.93 m)   Wt 74.8 kg (165 lb)   SpO2 90%   BMI 20.08 kg/m²       Intake/Output this shift:  Current Shift: No intake/output data recorded. Last 3 Shifts: 02/10 1901 - 02/12 0700  In: 1000 [I.V.:1000]  Out: -     Physical Exam:   General: alert, cooperative, moderate distress  Eye: conjunctivae/corneas clear. PERRL, EOM's intact. Throat and Neck: normal and no erythema or exudates noted. No mass   Lung: clear to auscultation bilaterally  Heart: regular rate and rhythm,   Abdomen: soft, non-tender. Bowel sounds normal. No masses,  Extremities: No LE edema. Left lower extremity is in external rotation and shortening. Moderate swelling noted in the proximal left thigh and hip region.   Skin was intact. Distally, dorsalis pedis was palpable. Ankle and toe extensors and flexors were grossly functioning. Skin: Normal.  Neurologic: AOx3. Motor function and sensation grossly intact. Psychiatric: non focal    Data Review:   Recent Results (from the past 24 hour(s))   PROTHROMBIN TIME + INR    Collection Time: 02/11/23 10:13 PM   Result Value Ref Range    Prothrombin time 13.6 11.9 - 14.6 sec    INR 1.0 0.9 - 1.1     CBC WITH AUTOMATED DIFF    Collection Time: 02/11/23 10:13 PM   Result Value Ref Range    WBC 6.2 4.1 - 11.1 K/uL    RBC 4.82 4.10 - 5.70 M/uL    HGB 15.7 12.1 - 17.0 g/dL    HCT 46.9 36.6 - 50.3 %    MCV 97.3 80.0 - 99.0 FL    MCH 32.6 26.0 - 34.0 PG    MCHC 33.5 30.0 - 36.5 g/dL    RDW 14.8 (H) 11.5 - 14.5 %    PLATELET 018 344 - 550 K/uL    MPV 9.3 8.9 - 12.9 FL    NRBC 0.0 0.0  WBC    ABSOLUTE NRBC 0.00 0.00 - 0.01 K/uL    NEUTROPHILS 66 32 - 75 %    LYMPHOCYTES 19 12 - 49 %    MONOCYTES 10 5 - 13 %    EOSINOPHILS 4 0 - 7 %    BASOPHILS 1 0 - 1 %    IMMATURE GRANULOCYTES 0 0 - 0.5 %    ABS. NEUTROPHILS 4.1 1.8 - 8.0 K/UL    ABS. LYMPHOCYTES 1.2 0.8 - 3.5 K/UL    ABS. MONOCYTES 0.6 0.0 - 1.0 K/UL    ABS. EOSINOPHILS 0.2 0.0 - 0.4 K/UL    ABS. BASOPHILS 0.0 0.0 - 0.1 K/UL    ABS. IMM. GRANS. 0.0 0.00 - 0.04 K/UL    DF AUTOMATED     METABOLIC PANEL, COMPREHENSIVE    Collection Time: 02/11/23 10:13 PM   Result Value Ref Range    Sodium 139 136 - 145 mmol/L    Potassium 3.9 3.5 - 5.1 mmol/L    Chloride 106 97 - 108 mmol/L    CO2 26 21 - 32 mmol/L    Anion gap 7 5 - 15 mmol/L    Glucose 96 65 - 100 mg/dL    BUN 12 6 - 20 mg/dL    Creatinine 1.19 0.70 - 1.30 mg/dL    BUN/Creatinine ratio 10 (L) 12 - 20      eGFR >60 >60 ml/min/1.73m2    Calcium 9.0 8.5 - 10.1 mg/dL    Bilirubin, total 0.4 0.2 - 1.0 mg/dL    AST (SGOT) 42 (H) 15 - 37 U/L    ALT (SGPT) 59 12 - 78 U/L    Alk.  phosphatase 113 45 - 117 U/L    Protein, total 7.5 6.4 - 8.2 g/dL    Albumin 4.0 3.5 - 5.0 g/dL    Globulin 3.5 2.0 - 4.0 g/dL    A-G Ratio 1.1 1.1 - 2.2     TYPE & SCREEN    Collection Time: 02/11/23 11:16 PM   Result Value Ref Range    Crossmatch Expiration 02/14/2023,2359     ABO/Rh(D) A Negative     Antibody screen Negative      Imaging    X-rays of the left hip showed a complex comminuted transverse/reverse oblique peritrochanteric fracture of the left proximal femur. CT scan of the left hip was ordered and reviewed. CT scan findings confirmed the x-ray findings as described above.

## 2023-02-12 NOTE — PROGRESS NOTES
Hospitalist Progress Note            Daily Progress Note: 2/12/2023 7:35 AM  Hospital course:     Obey Wheeler is a 74 y.o. male with PMH of COPD and stroke with residue left leg dis-coordination. He presented to the ED with chief complaint of left hip pain after a mechanical fall. Denies dizziness, weakness or loss of consciousness prior or after the fall. Pain is severe intensity, worsened with movement. In the ED, initially hypotensive, improved with IV fluid resuscitation. Afebrile, no leukocytosis. Hip x-ray showed communicated displaced intertrochanteric fracture. Admitted for further management. Orthopedic surgery consulted. Subjective:     Examined patient at the bedside. He continues to have considerable pain from displaced hip fracture. Assessment/Plan:   Active Problems:    Femur fracture ([address] (2/12/2023)      #Left femur fracture  - Pain control, IV morphine for severe pain 2 mg every 4, Roxicodone 5 mg and 10 mg for mild to moderate pain  - Consult orthopedics   - Keep NPO   - Hold anti-coagulant. # COPD  - Not in exacerbation  - Continue home medications.    - DuoNeb PRN     # Social Determents of health: None      DVT Prophylaxis:  Code Status: Full Code  POA/NOK:    Disposition and discharge barriers:   Orthopedic surgery consult  Pain management  PT and OT when appropriate  Care Plan discussed with: Patient and staff    Current Facility-Administered Medications   Medication Dose Route Frequency    0.9% sodium chloride infusion  100 mL/hr IntraVENous CONTINUOUS    sodium chloride (NS) flush 5-40 mL  5-40 mL IntraVENous Q8H    sodium chloride (NS) flush 5-40 mL  5-40 mL IntraVENous PRN    acetaminophen (TYLENOL) tablet 650 mg  650 mg Oral Q6H PRN    Or    acetaminophen (TYLENOL) suppository 650 mg  650 mg Rectal Q6H PRN    polyethylene glycol (MIRALAX) packet 17 g  17 g Oral DAILY PRN    ondansetron (ZOFRAN ODT) tablet 4 mg  4 mg Oral Q8H PRN    Or    ondansetron (ZOFRAN) injection 4 mg  4 mg IntraVENous Q6H PRN    albuterol-ipratropium (DUO-NEB) 2.5 MG-0.5 MG/3 ML  3 mL Nebulization Q6H PRN    budesonide-formoteroL (SYMBICORT) 160-4.5 mcg/actuation HFA inhaler 2 Puff  2 Puff Inhalation BID RT    tiotropium bromide (SPIRIVA RESPIMAT) 2.5 mcg /actuation  2 Puff Inhalation DAILY    fentaNYL citrate (PF) injection 50 mcg  50 mcg IntraVENous Q4H PRN    fentaNYL citrate (PF) injection 25 mcg  25 mcg IntraVENous Q4H PRN    0.9% sodium chloride infusion 1,000 mL  1,000 mL IntraVENous CONTINUOUS        REVIEW OF SYSTEMS    Review of Systems   Constitutional:  Positive for malaise/fatigue. Cardiovascular:  Positive for leg swelling. Musculoskeletal:  Positive for falls, joint pain and myalgias. Neurological:  Positive for weakness. Psychiatric/Behavioral:  The patient is nervous/anxious. Objective:     Visit Vitals  /80 (BP 1 Location: Left upper arm, BP Patient Position: Lying)   Pulse 86   Temp 97.5 °F (36.4 °C)   Resp 24   Ht 6' 4\" (1.93 m)   Wt 74.8 kg (165 lb)   SpO2 92%   BMI 20.08 kg/m²    O2 Flow Rate (L/min): 3 l/min O2 Device: Nasal cannula    Temp (24hrs), Av.9 °F (36.6 °C), Min:97.5 °F (36.4 °C), Max:98.5 °F (36.9 °C)        PHYSICAL EXAM:    Physical Exam  Constitutional:       Appearance: He is ill-appearing. Cardiovascular:      Rate and Rhythm: Normal rate and regular rhythm. Pulmonary:      Effort: No respiratory distress. Abdominal:      General: There is no distension. Tenderness: There is no abdominal tenderness. Musculoskeletal:         General: Deformity and signs of injury present. Comments: Left displaced femur fracture   Skin:     General: Skin is dry. Neurological:      Mental Status: He is oriented to person, place, and time. Motor: Weakness present.       Coordination: Coordination abnormal.      Gait: Gait abnormal.        Data Review    Recent Results (from the past 24 hour(s))   PROTHROMBIN TIME + INR    Collection Time: 02/11/23 10:13 PM   Result Value Ref Range    Prothrombin time 13.6 11.9 - 14.6 sec    INR 1.0 0.9 - 1.1     CBC WITH AUTOMATED DIFF    Collection Time: 02/11/23 10:13 PM   Result Value Ref Range    WBC 6.2 4.1 - 11.1 K/uL    RBC 4.82 4.10 - 5.70 M/uL    HGB 15.7 12.1 - 17.0 g/dL    HCT 46.9 36.6 - 50.3 %    MCV 97.3 80.0 - 99.0 FL    MCH 32.6 26.0 - 34.0 PG    MCHC 33.5 30.0 - 36.5 g/dL    RDW 14.8 (H) 11.5 - 14.5 %    PLATELET 239 098 - 132 K/uL    MPV 9.3 8.9 - 12.9 FL    NRBC 0.0 0.0  WBC    ABSOLUTE NRBC 0.00 0.00 - 0.01 K/uL    NEUTROPHILS 66 32 - 75 %    LYMPHOCYTES 19 12 - 49 %    MONOCYTES 10 5 - 13 %    EOSINOPHILS 4 0 - 7 %    BASOPHILS 1 0 - 1 %    IMMATURE GRANULOCYTES 0 0 - 0.5 %    ABS. NEUTROPHILS 4.1 1.8 - 8.0 K/UL    ABS. LYMPHOCYTES 1.2 0.8 - 3.5 K/UL    ABS. MONOCYTES 0.6 0.0 - 1.0 K/UL    ABS. EOSINOPHILS 0.2 0.0 - 0.4 K/UL    ABS. BASOPHILS 0.0 0.0 - 0.1 K/UL    ABS. IMM. GRANS. 0.0 0.00 - 0.04 K/UL    DF AUTOMATED     METABOLIC PANEL, COMPREHENSIVE    Collection Time: 02/11/23 10:13 PM   Result Value Ref Range    Sodium 139 136 - 145 mmol/L    Potassium 3.9 3.5 - 5.1 mmol/L    Chloride 106 97 - 108 mmol/L    CO2 26 21 - 32 mmol/L    Anion gap 7 5 - 15 mmol/L    Glucose 96 65 - 100 mg/dL    BUN 12 6 - 20 mg/dL    Creatinine 1.19 0.70 - 1.30 mg/dL    BUN/Creatinine ratio 10 (L) 12 - 20      eGFR >60 >60 ml/min/1.73m2    Calcium 9.0 8.5 - 10.1 mg/dL    Bilirubin, total 0.4 0.2 - 1.0 mg/dL    AST (SGOT) 42 (H) 15 - 37 U/L    ALT (SGPT) 59 12 - 78 U/L    Alk. phosphatase 113 45 - 117 U/L    Protein, total 7.5 6.4 - 8.2 g/dL    Albumin 4.0 3.5 - 5.0 g/dL    Globulin 3.5 2.0 - 4.0 g/dL    A-G Ratio 1.1 1.1 - 2.2     TYPE & SCREEN    Collection Time: 02/11/23 11:16 PM   Result Value Ref Range    Crossmatch Expiration 02/14/2023,2359     ABO/Rh(D) A Negative     Antibody screen Negative        XR CHEST PORT   Final Result   Bilateral increased interstitial markings.  No focal airspace process. XR HIP LT W OR WO PELV 2-3 VWS   Final Result         Comminuted and displaced intertrochanteric fracture. .      CT PELV WO CONT    (Results Pending)       Intake and Output:  Current Shift: No intake/output data recorded. Last three shifts: 02/10 1901 - 02/12 0700  In: 1000 [I.V.:1000]  Out: -       Lab/Data Review:  Recent Labs     02/11/23  2213   WBC 6.2   HGB 15.7   HCT 46.9        Recent Labs     02/11/23  2213      K 3.9      CO2 26   GLU 96   BUN 12   CREA 1.19   CA 9.0   ALB 4.0   TBILI 0.4   ALT 59   INR 1.0     No results for input(s): PH, PCO2, PO2, HCO3, FIO2 in the last 72 hours. Recent Results (from the past 24 hour(s))   PROTHROMBIN TIME + INR    Collection Time: 02/11/23 10:13 PM   Result Value Ref Range    Prothrombin time 13.6 11.9 - 14.6 sec    INR 1.0 0.9 - 1.1     CBC WITH AUTOMATED DIFF    Collection Time: 02/11/23 10:13 PM   Result Value Ref Range    WBC 6.2 4.1 - 11.1 K/uL    RBC 4.82 4.10 - 5.70 M/uL    HGB 15.7 12.1 - 17.0 g/dL    HCT 46.9 36.6 - 50.3 %    MCV 97.3 80.0 - 99.0 FL    MCH 32.6 26.0 - 34.0 PG    MCHC 33.5 30.0 - 36.5 g/dL    RDW 14.8 (H) 11.5 - 14.5 %    PLATELET 889 966 - 375 K/uL    MPV 9.3 8.9 - 12.9 FL    NRBC 0.0 0.0  WBC    ABSOLUTE NRBC 0.00 0.00 - 0.01 K/uL    NEUTROPHILS 66 32 - 75 %    LYMPHOCYTES 19 12 - 49 %    MONOCYTES 10 5 - 13 %    EOSINOPHILS 4 0 - 7 %    BASOPHILS 1 0 - 1 %    IMMATURE GRANULOCYTES 0 0 - 0.5 %    ABS. NEUTROPHILS 4.1 1.8 - 8.0 K/UL    ABS. LYMPHOCYTES 1.2 0.8 - 3.5 K/UL    ABS. MONOCYTES 0.6 0.0 - 1.0 K/UL    ABS. EOSINOPHILS 0.2 0.0 - 0.4 K/UL    ABS. BASOPHILS 0.0 0.0 - 0.1 K/UL    ABS. IMM.  GRANS. 0.0 0.00 - 0.04 K/UL    DF AUTOMATED     METABOLIC PANEL, COMPREHENSIVE    Collection Time: 02/11/23 10:13 PM   Result Value Ref Range    Sodium 139 136 - 145 mmol/L    Potassium 3.9 3.5 - 5.1 mmol/L    Chloride 106 97 - 108 mmol/L    CO2 26 21 - 32 mmol/L    Anion gap 7 5 - 15 mmol/L    Glucose 96 65 - 100 mg/dL    BUN 12 6 - 20 mg/dL    Creatinine 1.19 0.70 - 1.30 mg/dL    BUN/Creatinine ratio 10 (L) 12 - 20      eGFR >60 >60 ml/min/1.73m2    Calcium 9.0 8.5 - 10.1 mg/dL    Bilirubin, total 0.4 0.2 - 1.0 mg/dL    AST (SGOT) 42 (H) 15 - 37 U/L    ALT (SGPT) 59 12 - 78 U/L    Alk. phosphatase 113 45 - 117 U/L    Protein, total 7.5 6.4 - 8.2 g/dL    Albumin 4.0 3.5 - 5.0 g/dL    Globulin 3.5 2.0 - 4.0 g/dL    A-G Ratio 1.1 1.1 - 2.2     TYPE & SCREEN    Collection Time: 02/11/23 11:16 PM   Result Value Ref Range    Crossmatch Expiration 02/14/2023,2359     ABO/Rh(D) A Negative     Antibody screen Negative            _____________________________________________________________________________  Time spent in direct care including coordination of service, review of data and examination: > 35 minutes    ______________________________________________________________________________    Evelyne Soria NP    This is dictation was done by dragon, computer voice recognition software. Quite often unanticipated grammatical, syntax, homophones and other interpretive errors or inadvertently transcribed by the computer software. Please excuse errors that have escaped final proofreading. Thank you.

## 2023-02-12 NOTE — H&P
History and Physical    Patient: Christine Yusuf MRN: [id number]  SSN: [id number]    YOB: 1955  Age: 74 y.o. Sex: male      Subjective:      Christine Yusuf is a 74 y.o. male with PMH of COPD and stroke with residue left leg dis-coordination. He presented to the ED with chief complaint of left hip pain after a mechanical fall. Denies dizziness, weakness or loss of consciousness prior or after the fall. Pain is severe intensity, worsened with movement. In the ED, initially hypotensive, improved with IV fluid resuscitation. Afebrile, no leukocytosis. Hip x-ray showed communicated displaced intertrochanteric fracture    Chart review: None    No past medical history on file. No family history on file. Social History     Tobacco Use    Smoking status: Not on file    Smokeless tobacco: Not on file   Substance Use Topics    Alcohol use: Not on file        Objective:     Physical Exam:   General: alert, cooperative, moderate distress  Eye: conjunctivae/corneas clear. PERRL, EOM's intact. Throat and Neck: normal and no erythema or exudates noted. No mass   Lung: clear to auscultation bilaterally  Heart: regular rate and rhythm,   Abdomen: soft, non-tender. Bowel sounds normal. No masses,  Extremities: No LE edema. able to move all extremities normal, atraumatic. Peripheral pulses intact bilaterally  Skin: Normal.  Neurologic: AOx3. Motor function and sensation grossly intact. Psychiatric: non focal    Most recent lab work and imaging results reviewed in EMR. Assessment and plan:   #Left femur fracture  - Pain control. IV fentanyl PRN  - Consult orthopedics   - Keep NPO   - Hold anti-coagulant. # COPD  - Not in exacerbation  - Continue home medications. Criss Nino PRN    # Social Determents of health: None    # Full code by default, need further clarification    # Medication reconciliation: Medication list reviewed on Epic and with patient/family.      Signed By: Petey Lazcano MD February 12, 2023

## 2023-02-12 NOTE — ED TRIAGE NOTES
Pt presents from home via EMS after GLF, landed on left side on wood stacia. No deformity, + distal perfusion.   H/o COPD

## 2023-02-12 NOTE — ANESTHESIA PROCEDURE NOTES
Spinal Block    Start time: 2/12/2023 3:15 PM  End time: 2/12/2023 3:25 PM  Performed by: Chester Petersen CRNA  Authorized by: Myah Wall MD     Pre-procedure:   Indications: at surgeon's request, post-op pain management, procedure for pain and primary anesthetic  Preanesthetic Checklist: patient identified, risks and benefits discussed, anesthesia consent, site marked, patient being monitored, timeout performed and fire risk safety assessment completed and verbalized    Timeout Time: 15:16 EST      Spinal Block:   Patient Position:  Left lateral decubitus  Prep Region:  Lumbar  Prep: chlorhexidine      Location:  L4-5  Technique:  Single shot  Local: bupivacaine 0.75% in dextrose 8.25% preserv-free (SENSORCAINE) Intrathecal - Intrathecal   15 mg - 2/12/2023 3:20:00 PM  Local Dose (mL):  3  Med Admin Time: 2/12/2023 3:17 PM    Needle:   Needle Type:  Pencan  Needle Gauge:  25 G  Attempts:  1      Events: CSF confirmed, no blood with aspiration and no paresthesia        Assessment:  Insertion:  Uncomplicated  Patient tolerance:  Patient tolerated the procedure well with no immediate complications  -heme, - parasthesia, +csf

## 2023-02-13 LAB
ANION GAP SERPL CALC-SCNC: 2 MMOL/L (ref 5–15)
ATRIAL RATE: 81 BPM
BUN SERPL-MCNC: 22 MG/DL (ref 6–20)
BUN/CREAT SERPL: 20 (ref 12–20)
CA-I BLD-MCNC: 8.2 MG/DL (ref 8.5–10.1)
CALCULATED P AXIS, ECG09: 67 DEGREES
CALCULATED R AXIS, ECG10: -73 DEGREES
CALCULATED T AXIS, ECG11: 66 DEGREES
CHLORIDE SERPL-SCNC: 103 MMOL/L (ref 97–108)
CO2 SERPL-SCNC: 28 MMOL/L (ref 21–32)
CREAT SERPL-MCNC: 1.1 MG/DL (ref 0.7–1.3)
DIAGNOSIS, 93000: NORMAL
GLUCOSE BLD STRIP.AUTO-MCNC: 117 MG/DL (ref 65–100)
GLUCOSE SERPL-MCNC: 173 MG/DL (ref 65–100)
HGB BLD-MCNC: 10.9 G/DL (ref 12.1–17)
P-R INTERVAL, ECG05: 170 MS
PERFORMED BY, TECHID: ABNORMAL
POTASSIUM SERPL-SCNC: 4.7 MMOL/L (ref 3.5–5.1)
Q-T INTERVAL, ECG07: 378 MS
QRS DURATION, ECG06: 84 MS
QTC CALCULATION (BEZET), ECG08: 439 MS
SODIUM SERPL-SCNC: 133 MMOL/L (ref 136–145)
VENTRICULAR RATE, ECG03: 81 BPM

## 2023-02-13 PROCEDURE — 97530 THERAPEUTIC ACTIVITIES: CPT

## 2023-02-13 PROCEDURE — 85018 HEMOGLOBIN: CPT

## 2023-02-13 PROCEDURE — 74011000250 HC RX REV CODE- 250: Performed by: ORTHOPAEDIC SURGERY

## 2023-02-13 PROCEDURE — 82962 GLUCOSE BLOOD TEST: CPT

## 2023-02-13 PROCEDURE — 74011250637 HC RX REV CODE- 250/637: Performed by: ORTHOPAEDIC SURGERY

## 2023-02-13 PROCEDURE — 36415 COLL VENOUS BLD VENIPUNCTURE: CPT

## 2023-02-13 PROCEDURE — 80048 BASIC METABOLIC PNL TOTAL CA: CPT

## 2023-02-13 PROCEDURE — 94640 AIRWAY INHALATION TREATMENT: CPT

## 2023-02-13 PROCEDURE — 97161 PT EVAL LOW COMPLEX 20 MIN: CPT

## 2023-02-13 PROCEDURE — 65270000029 HC RM PRIVATE

## 2023-02-13 PROCEDURE — 97165 OT EVAL LOW COMPLEX 30 MIN: CPT

## 2023-02-13 PROCEDURE — 74011250636 HC RX REV CODE- 250/636: Performed by: ORTHOPAEDIC SURGERY

## 2023-02-13 RX ADMIN — SENNOSIDES AND DOCUSATE SODIUM 1 TABLET: 50; 8.6 TABLET ORAL at 08:16

## 2023-02-13 RX ADMIN — SODIUM CHLORIDE, PRESERVATIVE FREE 10 ML: 5 INJECTION INTRAVENOUS at 14:25

## 2023-02-13 RX ADMIN — ASPIRIN 325 MG: 325 TABLET, COATED ORAL at 08:16

## 2023-02-13 RX ADMIN — ASPIRIN 325 MG: 325 TABLET, COATED ORAL at 20:43

## 2023-02-13 RX ADMIN — CELECOXIB 200 MG: 200 CAPSULE ORAL at 08:16

## 2023-02-13 RX ADMIN — CEFAZOLIN SODIUM 2 G: 1 INJECTION, POWDER, FOR SOLUTION INTRAMUSCULAR; INTRAVENOUS at 02:11

## 2023-02-13 RX ADMIN — CELECOXIB 200 MG: 200 CAPSULE ORAL at 20:44

## 2023-02-13 RX ADMIN — BUDESONIDE AND FORMOTEROL FUMARATE DIHYDRATE 2 PUFF: 160; 4.5 AEROSOL RESPIRATORY (INHALATION) at 08:22

## 2023-02-13 RX ADMIN — SODIUM CHLORIDE, PRESERVATIVE FREE 10 ML: 5 INJECTION INTRAVENOUS at 20:50

## 2023-02-13 RX ADMIN — MORPHINE SULFATE 4 MG: 4 INJECTION, SOLUTION INTRAMUSCULAR; INTRAVENOUS at 13:15

## 2023-02-13 RX ADMIN — OXYCODONE 2.5 MG: 5 TABLET ORAL at 20:43

## 2023-02-13 RX ADMIN — ACETAMINOPHEN 1000 MG: 500 TABLET ORAL at 17:19

## 2023-02-13 RX ADMIN — POLYETHYLENE GLYCOL 3350 17 G: 17 POWDER, FOR SOLUTION ORAL at 08:16

## 2023-02-13 RX ADMIN — IPRATROPIUM BROMIDE AND ALBUTEROL SULFATE 3 ML: 2.5; .5 SOLUTION RESPIRATORY (INHALATION) at 08:21

## 2023-02-13 RX ADMIN — ACETAMINOPHEN 1000 MG: 500 TABLET ORAL at 05:56

## 2023-02-13 RX ADMIN — ACETAMINOPHEN 1000 MG: 500 TABLET ORAL at 11:24

## 2023-02-13 RX ADMIN — ACETAMINOPHEN 1000 MG: 500 TABLET ORAL at 23:10

## 2023-02-13 RX ADMIN — BUDESONIDE AND FORMOTEROL FUMARATE DIHYDRATE 2 PUFF: 160; 4.5 AEROSOL RESPIRATORY (INHALATION) at 20:52

## 2023-02-13 RX ADMIN — SODIUM CHLORIDE, PRESERVATIVE FREE 10 ML: 5 INJECTION INTRAVENOUS at 05:58

## 2023-02-13 NOTE — PROGRESS NOTES
Hospitalist Progress Note            Daily Progress Note: 2/13/2023 7:35 AM  Hospital course:     Eduar Augustin is a 74 y.o. male with PMH of COPD and stroke with residue left leg dis-coordination. He presented to the ED with chief complaint of left hip pain after a mechanical fall. Denies dizziness, weakness or loss of consciousness prior or after the fall. Pain is severe intensity, worsened with movement. In the ED, initially hypotensive, improved with IV fluid resuscitation. Afebrile, no leukocytosis. Hip x-ray showed communicated displaced intertrochanteric fracture. Admitted for further management. Orthopedic surgery consulted. 2/12 ORIF performed. Noted hemoglobin decline postoperatively. We will continue to monitor. PT OT recommending IRF    Subjective: Follow-up examination of patient at the bedside. Status post day 1 ORIF. Pain is much better controlled today. Assessment/Plan:   Active Problems:    Femur fracture ([facility]) (2/12/2023)    #Left femur fracture  - Pain control, IV morphine for severe pain 2 mg every 4, Roxicodone 5 mg and 10 mg for mild to moderate pain  -Orthopedic surgery following-2/12 ORIF performed  - Hold anti-coagulant.   -Noted hemoglobin decline from 15.7 to 10.9, continue to monitor closely  -PT and OT-recommending IRF      #Hyponatremia  -Gentle IV fluids overnight     # COPD  - Not in exacerbation  - Continue home medications.    - DuoNeb PRN     # Social Determents of health: None      DVT Prophylaxis:  Code Status: Full Code  POA/NOK:    Disposition and discharge barriers:   Orthopedic surgery consult  Pain management  PT and OT when appropriate  Care Plan discussed with: Patient and staff    Current Facility-Administered Medications   Medication Dose Route Frequency    polyethylene glycol (MIRALAX) packet 17 g  17 g Oral DAILY PRN    ondansetron (ZOFRAN ODT) tablet 4 mg  4 mg Oral Q8H PRN    Or    ondansetron (ZOFRAN) injection 4 mg  4 mg IntraVENous Q6H PRN    albuterol-ipratropium (DUO-NEB) 2.5 MG-0.5 MG/3 ML  3 mL Nebulization Q6H PRN    budesonide-formoteroL (SYMBICORT) 160-4.5 mcg/actuation HFA inhaler 2 Puff  2 Puff Inhalation BID RT    tiotropium bromide (SPIRIVA RESPIMAT) 2.5 mcg /actuation  2 Puff Inhalation DAILY    acetaminophen (TYLENOL) tablet 1,000 mg  1,000 mg Oral Q6H    celecoxib (CELEBREX) capsule 200 mg  200 mg Oral BID    morphine injection 4 mg  4 mg IntraVENous Q3H PRN    0.9% sodium chloride infusion  125 mL/hr IntraVENous CONTINUOUS    sodium chloride (NS) flush 5-40 mL  5-40 mL IntraVENous Q8H    sodium chloride (NS) flush 5-40 mL  5-40 mL IntraVENous PRN    traMADoL (ULTRAM) tablet 50 mg  50 mg Oral Q6H PRN    oxyCODONE IR (ROXICODONE) tablet 2.5 mg  2.5 mg Oral Q4H PRN    morphine injection 1 mg  1 mg IntraVENous Q4H PRN    ceFAZolin (ANCEF) 2 g in sterile water (preservative free) 20 mL IV syringe  2 g IntraVENous Q8H    naloxone (NARCAN) injection 0.4 mg  0.4 mg IntraVENous PRN    ondansetron (ZOFRAN) injection 4 mg  4 mg IntraVENous Q4H PRN    senna-docusate (PERICOLACE) 8.6-50 mg per tablet 1 Tablet  1 Tablet Oral BID    polyethylene glycol (MIRALAX) packet 17 g  17 g Oral DAILY    [START ON 2023] bisacodyL (DULCOLAX) suppository 10 mg  10 mg Rectal DAILY PRN    aspirin delayed-release tablet 325 mg  325 mg Oral BID        REVIEW OF SYSTEMS    Review of Systems   Constitutional:  Positive for malaise/fatigue. Cardiovascular:  Positive for leg swelling. Musculoskeletal:  Positive for falls, joint pain and myalgias. Neurological:  Positive for weakness. Psychiatric/Behavioral:  The patient is nervous/anxious.        Objective:     Visit Vitals  /65 (BP 1 Location: Left upper arm, BP Patient Position: At rest)   Pulse 80   Temp 98.6 °F (37 °C)   Resp 18   Ht 6' 4\" (1.93 m)   Wt 84.8 kg (186 lb 15.2 oz)   SpO2 93%   BMI 22.76 kg/m²    O2 Flow Rate (L/min): 3 l/min O2 Device: Nasal cannula    Temp (24hrs), Av °F (36.7 °C), Min:97.6 °F (36.4 °C), Max:98.6 °F (37 °C)        PHYSICAL EXAM:    Physical Exam  Constitutional:       Appearance: He is ill-appearing. Cardiovascular:      Rate and Rhythm: Normal rate and regular rhythm. Pulmonary:      Effort: No respiratory distress. Abdominal:      General: There is no distension. Tenderness: There is no abdominal tenderness. Musculoskeletal:         General: Deformity and signs of injury present. Comments: Left displaced femur fracture   Skin:     General: Skin is dry. Neurological:      Mental Status: He is oriented to person, place, and time. Motor: Weakness present. Coordination: Coordination abnormal.      Gait: Gait abnormal.        Data Review    Recent Results (from the past 24 hour(s))   METABOLIC PANEL, BASIC    Collection Time: 02/13/23  6:07 AM   Result Value Ref Range    Sodium 133 (L) 136 - 145 mmol/L    Potassium 4.7 3.5 - 5.1 mmol/L    Chloride 103 97 - 108 mmol/L    CO2 28 21 - 32 mmol/L    Anion gap 2 (L) 5 - 15 mmol/L    Glucose 173 (H) 65 - 100 mg/dL    BUN 22 (H) 6 - 20 mg/dL    Creatinine 1.10 0.70 - 1.30 mg/dL    BUN/Creatinine ratio 20 12 - 20      eGFR >60 >60 ml/min/1.73m2    Calcium 8.2 (L) 8.5 - 10.1 mg/dL   HEMOGLOBIN    Collection Time: 02/13/23  6:07 AM   Result Value Ref Range    HGB 10.9 (L) 12.1 - 17.0 g/dL       XR FEMUR LT 2 V   Final Result   Good alignment. XR FLUOROSCOPY UNDER 60 MINUTES   Final Result      CT PELV WO CONT   Final Result   1. Left femoral intertrochanteric comminuted fracture with varus angulation and   displacement. No underlying bone lesion. 2. Sacroiliac joint ankylosis. 3. Right inguinal hernia. XR CHEST PORT   Final Result   Bilateral increased interstitial markings. No focal airspace   process. XR HIP LT W OR WO PELV 2-3 VWS   Final Result         Comminuted and displaced intertrochanteric fracture. .          Intake and Output:  Current Shift: No intake/output data recorded. Last three shifts: 02/11 1901 - 02/13 0700  In: 2250 [I.V.:2250]  Out: 150       Lab/Data Review:  Recent Labs     02/13/23  0607 02/11/23 2213   WBC  --  6.2   HGB 10.9* 15.7   HCT  --  46.9   PLT  --  204       Recent Labs     02/13/23  0607 02/11/23 2213   * 139   K 4.7 3.9    106   CO2 28 26   * 96   BUN 22* 12   CREA 1.10 1.19   CA 8.2* 9.0   ALB  --  4.0   TBILI  --  0.4   ALT  --  59   INR  --  1.0       No results for input(s): PH, PCO2, PO2, HCO3, FIO2 in the last 72 hours. Recent Results (from the past 24 hour(s))   METABOLIC PANEL, BASIC    Collection Time: 02/13/23  6:07 AM   Result Value Ref Range    Sodium 133 (L) 136 - 145 mmol/L    Potassium 4.7 3.5 - 5.1 mmol/L    Chloride 103 97 - 108 mmol/L    CO2 28 21 - 32 mmol/L    Anion gap 2 (L) 5 - 15 mmol/L    Glucose 173 (H) 65 - 100 mg/dL    BUN 22 (H) 6 - 20 mg/dL    Creatinine 1.10 0.70 - 1.30 mg/dL    BUN/Creatinine ratio 20 12 - 20      eGFR >60 >60 ml/min/1.73m2    Calcium 8.2 (L) 8.5 - 10.1 mg/dL   HEMOGLOBIN    Collection Time: 02/13/23  6:07 AM   Result Value Ref Range    HGB 10.9 (L) 12.1 - 17.0 g/dL             _____________________________________________________________________________  Time spent in direct care including coordination of service, review of data and examination: > 35 minutes    ______________________________________________________________________________    Cora Bagley NP    This is dictation was done by dragon, computer voice recognition software. Quite often unanticipated grammatical, syntax, homophones and other interpretive errors or inadvertently transcribed by the computer software. Please excuse errors that have escaped final proofreading. Thank you.

## 2023-02-13 NOTE — PROGRESS NOTES
Reason for Admission:  Femur Fracture                     RUR Score:  9%                   Plan for utilizing home health:    No      PCP: First and Last name:  Antoinette Nair MD     Name of Practice:    Are you a current patient: Yes/No: Yes   Approximate date of last visit: 6 months ago   Can you participate in a virtual visit with your PCP:                     Current Advanced Directive/Advance Care Plan: Full Code      Healthcare Decision Maker:   Click here to complete [address] including selection of the [address] Relationship (ie \"Primary\")           Arlene Howe (wife) [phone]                  Transition of Care Plan:                      Cm received a message to contact pt's wife - Arlene Howe to discuss rehab. Cm met with pt at the bedside to complete discharge assessment. Cm verified pt's home address and emergency contact - Arlene Howe (wife). Pt ambulates without DME, however he uses nocturnal oxygen 3L supplied by [address] [place] [address] verified pt's PCP and health care insurance. PT is recommending IRF. Cm discussed and educated IRF. Pt reports his wife found a facility in [place], but he cannot recall the name of the rehab facility. Pt gave Cm permission to contact his wife, however he went ahead and tried to call his wife on his cell phone twice while writer was in the room. Pt's wife did not . Cm informed pt writer will give his wife a call. CM attempted to contact pt's wife on the hospital phone [phone]. Cm left a voice message.

## 2023-02-13 NOTE — PROGRESS NOTES
OCCUPATIONAL THERAPY EVALUATION  Patient: Hipolito Mcmanus (89 y.o. male)  Date: 2/13/2023  Primary Diagnosis: Femur fracture ([address] [S72.90XA]  Procedure(s) (LRB):  OPEN REDUCTION INTERNAL FIXATION OF LEFT HIP (Left) 1 Day Post-Op   Precautions: fall risk, WBAT LLE    In place during session: Peripheral IV and Nasal Cannula 3-4L    ASSESSMENT  Pt is a 74 y.o. male presenting to [facility] with c/o a fall with hip pain, admitted 2/11/23 and currently being treated for L femur fracture and COPD. X-ray L hip completed 2/11 shows a comminuted and displaced intertrochanteric fracture. Pt currently s/p ORIF L hip completed 2/12/23 by Dr. Shikha Davis. Pt is WBAT on the LLE with standard hip precautions. See below for home and PLOF information. Pt received semi-supine in bed upon arrival, AXO x4, and agreeable to OT evaluation. Based on current observations, pt presents with deficits in generalized strength/AROM, bed mobility, static/dynamic sitting balance, static/dynamic standing balance (see PT note for gait details), functional activity tolerance, LLE sensation, decreased safety awareness, and pain currently impacting overall performance of ADLs and functional transfers/mobility (see below for objective details and assist levels). Overall, pt tolerates session fair with pt completing bed mobility, sitting EOB, and sit<>stand transfer with mod A overall. Pt educated on hip precautions and WBAT status with pt verbalizing understanding. Socks donned while supine in bed with total A. Assistance req'd to guide BLE to the EOB, reach RUE toward the bed rail, and for scooting toward EOB with VC for sequencing tasks. Once seated EOB, pt's SpO2 checked and noted at ~83-84%. Pt educated on PLB with pt demo'ing understanding and recovering SpO2 to 92-93%. Sit<>stand completed with mod A, additional time, and VC to place BUE on RW and in correct placement.  Pt's SpO2 dropped to ~80-83% while standing so pt returned sitting EOB and cuing provided for PLB. Pt's SpO2 recovered to ~87% on 3L with pt unable to return to >90% so titrated O2 to 4L. Pt's SpO2 returned above 90% with PLB and O2 on 4L via NC. Pt then assisted back to bed with help from nsg with mod Ax1-2 overall. Pt's SpO2 continued to drop with activity so further mobility deferred and nsg updated. Pt then washed face with set up A to hand pt a wet wash cloth. Pt left in no apparent distress, all known needs met, call bell within reach, and side rails x3. Pt would benefit from continued skilled OT services to address current impairments and improve IND and safety with self cares and functional transfers/mobility. Current OT d/c recommendation Inpatient Rehabilitation Facility  once medically appropriate. Other factors to consider for discharge: family/social support, DME, time since onset, severity of deficits, functional baseline     Patient will benefit from skilled therapy intervention to address the above noted impairments. PLAN :  Recommendations and Planned Interventions: self care training, functional mobility training, therapeutic exercise, balance training, therapeutic activities, endurance activities, patient education, home safety training, and family training/education    Recommend with staff: Encourage HEP in prep for ADLs/mobility    Recommend next session: Toileting and LB dressing     Frequency/Duration: Patient will be followed by occupational therapy:  3-5x/week to address goals. Recommendation for discharge: (in order for the patient to meet his/her long term goals)  [address]     This discharge recommendation:  Has been made in collaboration with the attending provider and/or case management    IF patient discharges home will need the following DME: TBD       SUBJECTIVE:   Patient stated That's not the lowest I've seen my oxygen go.     OBJECTIVE DATA SUMMARY:   HISTORY:   No past medical history on file.   No past surgical history on file.    Per pt:   Home Situation  Home Environment: Private residence  # Steps to Enter: 3  One/Two Story Residence: Two story, live on 1st floor  Living Alone: No  Support Systems: Spouse/Significant Other  Patient Expects to be Discharged to[de-identified] Rehab Unit Subacute  Current DME Used/Available at Home: Grab bars, Shower chair  Tub or Shower Type: Shower    Hand dominance: Right    EXAMINATION OF PERFORMANCE DEFICITS:  Cognitive/Behavioral Status:  Neurologic State: Alert  Orientation Level: Oriented X4  Cognition: Follows commands;Poor safety awareness        Safety/Judgement: Decreased insight into deficits    Hearing: Auditory  Auditory Impairment: None    Range of Motion:  AROM: Generally decreased, functional    Strength:  Strength: Generally decreased, functional    Coordination:  Coordination: Within functional limits  Fine Motor Skills-Upper: Left Intact; Right Intact    Gross Motor Skills-Upper: Left Intact; Right Intact    Balance:  Sitting: Impaired  Sitting - Static: Good (unsupported)  Sitting - Dynamic: Fair (occasional)  Standing: Impaired; With support  Standing - Static: Constant support;Poor    Functional Mobility and Transfers for ADLs:  Bed Mobility:  Rolling: Moderate assistance  Supine to Sit: Moderate assistance  Sit to Supine: Moderate assistance;Assist x2  Scooting: Moderate assistance (To EOB)    Transfers:  Sit to Stand: Moderate assistance; Additional time (Bed elevated)  Stand to Sit: Moderate assistance; Additional time      ADL Intervention and task modifications:  Grooming  Position Performed: Other (comment) (Semi supine)  Washing Face: Set-up    Lower Body Dressing Assistance  Socks: Total assistance (dependent)  Position Performed: Supine    Cognitive Retraining  Safety/Judgement: Decreased insight into deficits    Therapeutic Exercise:  Pt would benefit from UE HEP in prep for ADLs and functional mobility/transfers.      Functional Measure:    [employer] AM-[employer]LA[employer]NE \"6 Clicks\" Daily Activity Inpatient Short Form  How much help from another person does the patient currently need. .. Total; A Lot A Little None   1. Putting on and taking off regular lower body clothing? [x]  1 []  2 []  3 []  4   2. Bathing (including washing, rinsing, drying)? [x]  1 []  2 []  3 []  4   3. Toileting, which includes using toilet, bedpan or urinal? [x] 1 []  2 []  3 []  4   4. Putting on and taking off regular upper body clothing? []  1 []  2 [x]  3 []  4   5. Taking care of personal grooming such as brushing teeth? []  1 []  2 []  3 [x]  4   6. Eating meals? []  1 []  2 []  3 [x]  4   © , Trustees of [address] [place] [address] [address] [id number], under license to [employer]. All rights reserved     Score:      Interpretation of Tool:  Represents clinically-significant functional categories (i.e. Activities of daily living). Percentage of Impairment CH    0%   CI    1-19% CJ    20-39% CK    40-59% CL    60-79% CM    80-99% CN     100%   [facility]  Score 6-24 24 23 20-22 15-19 10-14 7-9 6     Occupational Therapy Evaluation Charge Determination   History Examination Decision-Making   LOW Complexity : Brief history review  MEDIUM Complexity : 3-5 performance deficits relating to physical, cognitive , or psychosocial skils that result in activity limitations and / or participation restrictions MEDIUM Complexity : Patient may present with comorbidities that affect occupational performnce.  Miniml to moderate modification of tasks or assistance (eg, physical or verbal ) with assesment(s) is necessary to enable patient to complete evaluation       Based on the above components, the patient evaluation is determined to be of the following complexity level: LOW     Pain Ratin-3/10 L hip    Activity Tolerance:   Fair, desaturates with exertion and requires oxygen, and requires rest breaks    After treatment patient left in no apparent distress:    Supine in bed, Call bell within reach, Side rails x 3, and nsg updated , bed locked and in lowest position    COMMUNICATION/EDUCATION:   The patients plan of care was discussed with: Physical therapist and Registered nurse. Patient/family agree to work toward stated goals and plan of care. This patients plan of care is appropriate for delegation to [facility]. Thank you for this referral.  Benny Cortez OT  Time Calculation: 54 mins    Problem: Self Care Deficits Care Plan (Adult)  Goal: *Acute Goals and Plan of Care (Insert Text)  Description:   FUNCTIONAL STATUS PRIOR TO ADMISSION: Patient was independent and active without use of DME. Patient required minimal assistance for basic and instrumental ADLs. HOME SUPPORT: The patient lived with his wife and required minimal assistance/contact guard assist for bathing and dressing. Occupational Therapy Goals  Initiated 2/13/2023  Patient Goal: Go to rehab. 1.  Patient will perform lower body dressing with modified independence within 7 day(s). 2.  Patient will perform grooming with modified independence within 7 day(s). 3.  Patient will perform bathing with modified independence within 7 day(s). 4.  Patient will perform toilet transfers with modified independence within 7 day(s). 5.  Patient will perform all aspects of toileting with modified independence within 7 day(s). 6.  Patient will participate in upper extremity therapeutic exercise/activities with independence within 7 day(s). 7.  Patient will utilize energy conservation techniques during functional activities with verbal cues within 7 day(s).   Outcome: Not Met

## 2023-02-13 NOTE — PROGRESS NOTES
Problem: Pressure Injury - Risk of  Goal: *Prevention of pressure injury  Description: Document Ector Scale and appropriate interventions in the flowsheet. Outcome: Progressing Towards Goal  Note: Pressure Injury Interventions:       Moisture Interventions: Absorbent underpads    Activity Interventions: PT/OT evaluation    Mobility Interventions: Float heels, Pressure redistribution bed/mattress (bed type)    Nutrition Interventions: Offer support with meals,snacks and hydration    Friction and Shear Interventions: HOB 30 degrees or less                Problem: Falls - Risk of  Goal: *Absence of Falls  Description: Document Davey Fall Risk and appropriate interventions in the flowsheet.   Outcome: Progressing Towards Goal  Note: Fall Risk Interventions:            Medication Interventions: Bed/chair exit alarm    Elimination Interventions: Bed/chair exit alarm, Call light in reach    History of Falls Interventions: Bed/chair exit alarm         Problem: Patient Education: Go to Patient Education Activity  Goal: Patient/Family Education  Outcome: Progressing Towards Goal

## 2023-02-13 NOTE — PROGRESS NOTES
Progress Note  Date:2023       Room:[facility]  Patient Carley Wheeler     YOB: 1955     Age:67 y.o. Subjective    Subjective   Mr. Jvaon Martínez is postop day 1 after open repair of his left proximal femur complex peritrochanteric fracture with a long intramedullary gamma nail, on 2023. He feels much better, and had no complaints today. Review of Systems  Objective         Vitals Last 24 Hours:  TEMPERATURE:  Temp  Av.4 °F (36.9 °C)  Min: 97.6 °F (36.4 °C)  Max: 98.6 °F (37 °C)  RESPIRATIONS RANGE: Resp  Av.5  Min: 16  Max: 18  PULSE OXIMETRY RANGE: SpO2  Av.8 %  Min: 92 %  Max: 98 %  PULSE RANGE: Pulse  Av.6  Min: 80  Max: 87  BLOOD PRESSURE RANGE: Systolic (98HBF), IOP:593 , Min:104 , KAELA:500   ; Diastolic (12WYO), MTY:92, Min:61, Max:82    I/O (24Hr): No intake or output data in the 24 hours ending 23  Objective  On examination the patient was pleasant and cooperative alert and oriented. General physical examination was unremarkable. Examination of the left hip showed clean and dry surgical dressing with no soakage. Mild swelling in the left thigh was noted, consistent with surgery. Distal neurovascular examination was intact. Labs/Imaging/Diagnostics    Labs:  CBC:  Recent Labs     23   WBC  --  6.2   RBC  --  4.82   HGB 10.9* 15.7   HCT  --  46.9   MCV  --  97.3   RDW  --  14.8*   PLT  --  204     CHEMISTRIES:  Recent Labs     23  0623   * 139   K 4.7 3.9    106   CO2 28 26   BUN 22* 12   CA 8.2* 9.0   PT/INR:  Recent Labs     23   INR 1.0     APTT:No results for input(s): APTT in the last 72 hours. LIVER PROFILE:  Recent Labs     23   AST 42*   ALT 59     Lab Results   Component Value Date/Time    ALT (SGPT) 59 2023 10:13 PM    AST (SGOT) 42 (H) 2023 10:13 PM    Alk.  phosphatase 113 2023 10:13 PM    Bilirubin, total 0.4 02/11/2023 10:13 PM       Imaging Last 24 Hours:  No results found. Assessment//Plan   Active Problems:    Femur fracture ([address] (2/12/2023)      Assessment & Plan    Patient is stable with no postoperative complications postop day #1. Continue physical therapy and rehab. Case management has been consulted for discharge planning and placement. Patient will need skilled nursing facility for subacute rehab, due to his limited function and neurologic deficits in his lower extremity even prior to his fracture. Follow-up with Dr. Latha Li at [address] [phone] in 2 to 3 weeks. Patient is cleared for discharge from orthopedic viewpoint.     Electronically signed by Osvaldo Chery MD on 2/13/2023 at 6:52 PM

## 2023-02-13 NOTE — PROGRESS NOTES
Patient received from PACU AT 1820, awake but drowsy. On 1L oxygen. Dressing clean dry and intact. Bedside report received. Vitals revealed pulse ox of 70% on 1L. Noted patient is typically on 2-3 at home. Oxygen increased to 3L, then to 4L, then to 6L then to 8L nasal cannula with highest pulse ox of 82%. Switched to oxymask 8L and o2 increased to 95%. Slowly decreased back to 3L nasal cannula with pulse ox of 95%. No apnea noted. No further concerns. Simple swallow test passed, patient tolerated water, jello and provided ensure per request. No concerns for aspiration presented. Orders reviewed and released. Duplicate orders discontinued.

## 2023-02-13 NOTE — PROGRESS NOTES
Problem: Mobility Impaired (Adult and Pediatric)  Goal: *Acute Goals and Plan of Care (Insert Text)  Description: FUNCTIONAL STATUS PRIOR TO ADMISSION: Patient was independent and active without use of DME.    HOME SUPPORT PRIOR TO ADMISSION: The patient lived with spouse and required min A for ADL's like bathing    Physical Therapy Goals  Initiated 2/13/2023  Patient/family stated goal: to get stronger and walk again  1. Patient will move from supine to sit and sit to supine  in bed with supervision/set-up within 7 day(s). 2.  Patient will transfer from bed to chair and chair to bed with supervision/set-up using the least restrictive device within 7 day(s). 3.  Patient will perform sit to stand with supervision/set-up within 7 day(s). 4.  Patient will ambulate with minimal assistance/contact guard assist for 75 feet with the least restrictive device within 7 day(s). 5.  Patient will ascend/descend 3 stairs with B handrail(s) with minimal assistance/contact guard assist within 7 day(s). 6.  Patient will participate in lower extremity therapeutic exercise/activities with minimal assistance/contact guard assist for 10 minutes within 7 day(s). Outcome: Not Met  PHYSICAL THERAPY EVALUATION  Patient: Becky Zarate (89 y.o. male)  Date: 2/13/2023  Primary Diagnosis: Femur fracture ([facility]) [S72.90XA]  Procedure(s) (LRB):  OPEN REDUCTION INTERNAL FIXATION OF LEFT HIP (Left) 1 Day Post-Op   Precautions: WBAT LLE, fall    In place during session: Peripheral IV and Nasal Cannula 3L    ASSESSMENT  Pt is a 74 y.o. male admitted on 2/11/2023 for L hip pain after mechanical fall sustaining intertrochanteric hip fx on the L; pt currently being treated for s/p ORIF L hip on 2/12/23. Pt with hx of CVA with residual L LE dis-coordination per his report. Pt semi-supine upon PT arrival, agreeable to evaluation. Pt A&O x 4. Pt stating he needed to have a bowel movement and wanted to get to the bedside commode.   Pt currently on 3LO2 with O2 sats at 95%. Based on the objective data described, the patient presents with generalized weakness, impaired functional mobility, impaired amb, impaired balance and decreased safety awareness (See below for objective details and assist levels). Pt having a lot of pain with movement and stating he needs to move with both of his legs together to help manage the pain. Pt requiring min A to get to EOB with fair-good sitting balance, denying any dizziness or lightheadedness once EOB. Pt's O2 sats at 93%. Pt requiring cues for hand placement for sit to stand transfer and min A to get to standing. Pt amb  a few steps to the commode with RW and CGA for safety. Pt unable to place his L foot flat on the ground and was ambulating with only forefoot on the ground even though he is WBAT. Pt with slow shama, decreased step clearance, requiring cues from PT for safety to make sure he was centered over the commode before sitting. Pt's O2 sats dropped to 88% during transfer. Pt assisted back to bed once finished on the commode with RN present for safety as pt has difficulty ambulating due to pain and inability to place his L foot flat on the floor as a result. Pt's O2 sats dropped to 83% at the lowest during treatment following time in standing to get cleaned up from [facility] and then transfer back to the bed. O2 sats gunjan to 95% at the end of treatment once pt was resting in the bed. Pt assisted back to bed and was screaming out during rolling side to side to reposition imelda pad due to pain in his L hip. RN in room with pain medicine following treatment. Overall pt tolerated session fair today with 8/10 pain with movement, decreased safety awareness and dropping O2 sats with activity. Pt will benefit from continued skilled PT to address above deficits and return to PLOF. Current PT DC recommendation Inpatient Rehabilitation Facility  once medically appropriate.     Current Level of Function Impacting Discharge (mobility/balance): impaired mobility, level of assist         PLAN :  Recommendations and Planned Interventions: bed mobility training, transfer training, gait training, therapeutic exercises, patient and family training/education, and therapeutic activities      Recommend for staff: Frequent repositioning to prevent skin breakdown and LE elevation for management of edema    Frequency/Duration: Patient will be followed by physical therapy:  3-5x/week to address goals. Recommendation for discharge: (in order for the patient to meet his/her long term goals)  [address]     This discharge recommendation:  Has been made in collaboration with the attending provider and/or case management    IF patient discharges home will need the following DME: none         SUBJECTIVE:   Patient stated you have to move my legs together for me to sit up.     OBJECTIVE DATA SUMMARY:   HISTORY:    No past medical history on file. No past surgical history on file. Home Situation  Home Environment: Private residence  # Steps to Enter: 3  One/Two Story Residence: Two story, live on 1st floor  Living Alone: No  Support Systems: Spouse/Significant Other  Patient Expects to be Discharged to[de-identified] Rehab Unit Subacute  Current DME Used/Available at Home: Grab bars, Shower chair  Tub or Shower Type: Shower    EXAMINATION/PRESENTATION/DECISION MAKING:   Critical Behavior:  Neurologic State: Alert  Orientation Level: Oriented X4  Cognition: Follows commands, Poor safety awareness  Safety/Judgement: Decreased insight into deficits  Hearing:   Auditory  Auditory Impairment: None  Range Of Motion:  AROM: Generally decreased, functional      Severely restricted in L knee flex and extension                 Strength:    Strength: Generally decreased, functional         Right Left   HIP FLEXION 2+/5 1/5   HIP EXTENSION     HIP ABDUCTION     HIP ADDUCTION     KNEE FLEXION 4-/5 2/5   KNEE EXTENSION 4-/5 2/5   ANKLE PF  4/5 3-/5   ANKLE DF 4/5 3-/5     0/5       No palpable muscle contraction  1/5       Palpable muscle contraction, no joint movement  2-/5      Less than full range of motion in gravity eliminated position  2/5       Able to complete full range of motion in gravity eliminated position  2+/5     Able to initiate movement against gravity  3-/5      More than half but not full range of motion against gravity  3/5       Able to complete full range of motion against gravity  3+/5     Completes full range of motion against gravity with minimal resistance  4-/5      Completes full range of motion against gravity with minimal-moderate resistance  4/5       Completes full range of motion against gravity with moderate resistance  4+/5     Completes full range of motion against gravity with moderate-maximum resistance  5/5       Completes full range of motion against gravity with maximum resistance                                           Coordination:  Coordination: Within functional limits  Vision:      Functional Mobility:  Bed Mobility:  Rolling: Minimum assistance  Supine to Sit: Minimum assistance  Sit to Supine: Moderate assistance;Assist x2  Scooting: Minimum assistance  Transfers:  Sit to Stand: Moderate assistance; Additional time; Other (comment) (bed elevated)  Stand to Sit: Moderate assistance; Additional time; Other (comment) (bed elevated)                       Balance:   Sitting: Impaired  Sitting - Static: Good (unsupported)  Sitting - Dynamic: Fair (occasional)  Standing: Impaired; With support  Standing - Static: Fair;Constant support  Standing - Dynamic : Poor;Constant support  Ambulation/Gait Training:  Distance (ft): 4 Feet (ft)  Assistive Device: Gait belt;Walker, rolling  Ambulation - Level of Assistance: Minimal assistance     Gait Description (WDL): Exceptions to WDL  Gait Abnormalities: Antalgic              Speed/[place]: Shuffled  Step Length: Right shortened;Left shortened                      Functional Measure:  [employer] AM-PAC 6 Clicks         Basic Mobility Inpatient Short Form  How much difficulty does the patient currently have. .. Unable A Lot A Little None   1. Turning over in bed (including adjusting bedclothes, sheets and blankets)? [] 1   [] 2   [x] 3   [] 4   2. Sitting down on and standing up from a chair with arms ( e.g., wheelchair, bedside commode, etc.)   [] 1   [x] 2   [] 3   [] 4   3. Moving from lying on back to sitting on the side of the bed? [] 1   [] 2   [x] 3   [] 4          How much help from another person does the patient currently need. .. Total A Lot A Little None   4. Moving to and from a bed to a chair (including a wheelchair)? [] 1   [] 2   [x] 3   [] 4   5. Need to walk in hospital room? [] 1   [] 2   [x] 3   [] 4   6. Climbing 3-5 steps with a railing? [] 1   [x] 2   [] 3   [] 4   © , Trustees of [employer], under license to [employer]. All rights reserved     Score:  Initial: 16 Most Recent: X (Date: 23 )   Interpretation of Tool:  Represents activities that are increasingly more difficult (i.e. Bed mobility, Transfers, Gait).   Score 24 23 22-20 19-15 14-10 9-7 6   Modifier CH CI CJ CK CL CM CN         Physical Therapy Evaluation Charge Determination   History Examination Presentation Decision-Making   MEDIUM  Complexity : 1-2 comorbidities / personal factors will impact the outcome/ POC  MEDIUM Complexity : 3 Standardized tests and measures addressing body structure, function, activity limitation and / or participation in recreation  LOW Complexity : Stable, uncomplicated  Other outcome measures [facility] 6  low      Based on the above components, the patient evaluation is determined to be of the following complexity level: LOW     Pain Ratin/10 L  leg    Activity Tolerance:   Fair, desaturates with exertion and requires oxygen, requires rest breaks, and observed SOB with activity    After treatment patient left in no apparent distress: Bed locked and in lowest position Supine in bed, Call bell within reach, and Side rails x 3 and nursing updated. COMMUNICATION/EDUCATION:   The patients plan of care was discussed with: Occupational therapist and Registered nurse. Patient/family agree to work toward stated goals and plan of care.          Thank you for this referral.  Wilson Cortes, PT   Time Calculation: 33 mins

## 2023-02-14 LAB
ANION GAP SERPL CALC-SCNC: 2 MMOL/L (ref 5–15)
BASOPHILS # BLD: 0 K/UL (ref 0–0.1)
BASOPHILS NFR BLD: 0 % (ref 0–1)
BUN SERPL-MCNC: 16 MG/DL (ref 6–20)
BUN/CREAT SERPL: 17 (ref 12–20)
CA-I BLD-MCNC: 9 MG/DL (ref 8.5–10.1)
CHLORIDE SERPL-SCNC: 103 MMOL/L (ref 97–108)
CO2 SERPL-SCNC: 31 MMOL/L (ref 21–32)
CREAT SERPL-MCNC: 0.95 MG/DL (ref 0.7–1.3)
DIFFERENTIAL METHOD BLD: ABNORMAL
EOSINOPHIL # BLD: 0.2 K/UL (ref 0–0.4)
EOSINOPHIL NFR BLD: 3 % (ref 0–7)
ERYTHROCYTE [DISTWIDTH] IN BLOOD BY AUTOMATED COUNT: 14.8 % (ref 11.5–14.5)
GLUCOSE BLD STRIP.AUTO-MCNC: 101 MG/DL (ref 65–100)
GLUCOSE BLD STRIP.AUTO-MCNC: 103 MG/DL (ref 65–100)
GLUCOSE BLD STRIP.AUTO-MCNC: 97 MG/DL (ref 65–100)
GLUCOSE SERPL-MCNC: 104 MG/DL (ref 65–100)
HCT VFR BLD AUTO: 33.4 % (ref 36.6–50.3)
HGB BLD-MCNC: 11 G/DL (ref 12.1–17)
IMM GRANULOCYTES # BLD AUTO: 0 K/UL (ref 0–0.04)
IMM GRANULOCYTES NFR BLD AUTO: 0 % (ref 0–0.5)
LYMPHOCYTES # BLD: 0.8 K/UL (ref 0.8–3.5)
LYMPHOCYTES NFR BLD: 10 % (ref 12–49)
MCH RBC QN AUTO: 32.4 PG (ref 26–34)
MCHC RBC AUTO-ENTMCNC: 32.9 G/DL (ref 30–36.5)
MCV RBC AUTO: 98.5 FL (ref 80–99)
MONOCYTES # BLD: 0.8 K/UL (ref 0–1)
MONOCYTES NFR BLD: 11 % (ref 5–13)
NEUTS SEG # BLD: 5.9 K/UL (ref 1.8–8)
NEUTS SEG NFR BLD: 76 % (ref 32–75)
NRBC # BLD: 0 K/UL (ref 0–0.01)
NRBC BLD-RTO: 0 PER 100 WBC
PERFORMED BY, TECHID: ABNORMAL
PERFORMED BY, TECHID: ABNORMAL
PERFORMED BY, TECHID: NORMAL
PLATELET # BLD AUTO: 161 K/UL (ref 150–400)
PMV BLD AUTO: 10.2 FL (ref 8.9–12.9)
POTASSIUM SERPL-SCNC: 4 MMOL/L (ref 3.5–5.1)
RBC # BLD AUTO: 3.39 M/UL (ref 4.1–5.7)
RBC MORPH BLD: ABNORMAL
SODIUM SERPL-SCNC: 136 MMOL/L (ref 136–145)
WBC # BLD AUTO: 7.7 K/UL (ref 4.1–11.1)

## 2023-02-14 PROCEDURE — 82962 GLUCOSE BLOOD TEST: CPT

## 2023-02-14 PROCEDURE — 74011000250 HC RX REV CODE- 250: Performed by: ORTHOPAEDIC SURGERY

## 2023-02-14 PROCEDURE — 85025 COMPLETE CBC W/AUTO DIFF WBC: CPT

## 2023-02-14 PROCEDURE — 77010033678 HC OXYGEN DAILY

## 2023-02-14 PROCEDURE — 80048 BASIC METABOLIC PNL TOTAL CA: CPT

## 2023-02-14 PROCEDURE — 74011250637 HC RX REV CODE- 250/637: Performed by: ORTHOPAEDIC SURGERY

## 2023-02-14 PROCEDURE — 94761 N-INVAS EAR/PLS OXIMETRY MLT: CPT

## 2023-02-14 PROCEDURE — 36415 COLL VENOUS BLD VENIPUNCTURE: CPT

## 2023-02-14 PROCEDURE — 94640 AIRWAY INHALATION TREATMENT: CPT

## 2023-02-14 PROCEDURE — 97530 THERAPEUTIC ACTIVITIES: CPT

## 2023-02-14 PROCEDURE — 65270000029 HC RM PRIVATE

## 2023-02-14 PROCEDURE — 74011250636 HC RX REV CODE- 250/636: Performed by: NURSE PRACTITIONER

## 2023-02-14 RX ORDER — HEPARIN SODIUM 5000 [USP'U]/ML
5000 INJECTION, SOLUTION INTRAVENOUS; SUBCUTANEOUS EVERY 12 HOURS
Status: DISCONTINUED | OUTPATIENT
Start: 2023-02-14 | End: 2023-02-15 | Stop reason: HOSPADM

## 2023-02-14 RX ADMIN — CELECOXIB 200 MG: 200 CAPSULE ORAL at 08:28

## 2023-02-14 RX ADMIN — OXYCODONE 2.5 MG: 5 TABLET ORAL at 11:11

## 2023-02-14 RX ADMIN — ASPIRIN 325 MG: 325 TABLET, COATED ORAL at 08:28

## 2023-02-14 RX ADMIN — ACETAMINOPHEN 1000 MG: 500 TABLET ORAL at 17:01

## 2023-02-14 RX ADMIN — TIOTROPIUM BROMIDE INHALATION SPRAY 2 PUFF: 3.12 SPRAY, METERED RESPIRATORY (INHALATION) at 09:55

## 2023-02-14 RX ADMIN — HEPARIN SODIUM 5000 UNITS: 5000 INJECTION INTRAVENOUS; SUBCUTANEOUS at 12:28

## 2023-02-14 RX ADMIN — OXYCODONE 2.5 MG: 5 TABLET ORAL at 17:04

## 2023-02-14 RX ADMIN — ASPIRIN 325 MG: 325 TABLET, COATED ORAL at 21:16

## 2023-02-14 RX ADMIN — IPRATROPIUM BROMIDE AND ALBUTEROL SULFATE 3 ML: 2.5; .5 SOLUTION RESPIRATORY (INHALATION) at 22:52

## 2023-02-14 RX ADMIN — SENNOSIDES AND DOCUSATE SODIUM 1 TABLET: 50; 8.6 TABLET ORAL at 08:28

## 2023-02-14 RX ADMIN — BUDESONIDE AND FORMOTEROL FUMARATE DIHYDRATE 2 PUFF: 160; 4.5 AEROSOL RESPIRATORY (INHALATION) at 09:55

## 2023-02-14 RX ADMIN — SODIUM CHLORIDE, PRESERVATIVE FREE 10 ML: 5 INJECTION INTRAVENOUS at 21:21

## 2023-02-14 RX ADMIN — BUDESONIDE AND FORMOTEROL FUMARATE DIHYDRATE 2 PUFF: 160; 4.5 AEROSOL RESPIRATORY (INHALATION) at 22:50

## 2023-02-14 RX ADMIN — ACETAMINOPHEN 1000 MG: 500 TABLET ORAL at 05:03

## 2023-02-14 RX ADMIN — SODIUM CHLORIDE, PRESERVATIVE FREE 10 ML: 5 INJECTION INTRAVENOUS at 13:22

## 2023-02-14 RX ADMIN — SODIUM CHLORIDE, PRESERVATIVE FREE 10 ML: 5 INJECTION INTRAVENOUS at 05:02

## 2023-02-14 RX ADMIN — IPRATROPIUM BROMIDE AND ALBUTEROL SULFATE 3 ML: 2.5; .5 SOLUTION RESPIRATORY (INHALATION) at 09:55

## 2023-02-14 RX ADMIN — OXYCODONE 2.5 MG: 5 TABLET ORAL at 21:17

## 2023-02-14 NOTE — PROGRESS NOTES
Problem: Pressure Injury - Risk of  Goal: *Prevention of pressure injury  Description: Document Ector Scale and appropriate interventions in the flowsheet. Outcome: Progressing Towards Goal  Note: Pressure Injury Interventions:  Sensory Interventions: Avoid rigorous massage over bony prominences, Float heels, Maintain/enhance activity level, Minimize linen layers, Monitor skin under medical devices, Pressure redistribution bed/mattress (bed type)    Moisture Interventions: Apply protective barrier, creams and emollients, Maintain skin hydration (lotion/cream), Minimize layers, Moisture barrier    Activity Interventions: Pressure redistribution bed/mattress(bed type)    Mobility Interventions: Pressure redistribution bed/mattress (bed type), PT/OT evaluation    Nutrition Interventions: Document food/fluid/supplement intake, Offer support with meals,snacks and hydration    Friction and Shear Interventions: Feet elevated on foot rest, Minimize layers                Problem: Patient Education: Go to Patient Education Activity  Goal: Patient/Family Education  Outcome: Progressing Towards Goal     Problem: Falls - Risk of  Goal: *Absence of Falls  Description: Document Davey Fall Risk and appropriate interventions in the flowsheet.   Outcome: Progressing Towards Goal  Note: Fall Risk Interventions:            Medication Interventions: Bed/chair exit alarm    Elimination Interventions: Call light in reach, Bed/chair exit alarm    History of Falls Interventions: Bed/chair exit alarm         Problem: Patient Education: Go to Patient Education Activity  Goal: Patient/Family Education  Outcome: Progressing Towards Goal     Problem: Patient Education: Go to Patient Education Activity  Goal: Patient/Family Education  Outcome: Progressing Towards Goal     Problem: Patient Education: Go to Patient Education Activity  Goal: Patient/Family Education  Outcome: Progressing Towards Goal

## 2023-02-14 NOTE — PROGRESS NOTES
OCCUPATIONAL THERAPY TREATMENT  Patient: Michelle Dennison (89 y.o. male)  Date: 2/14/2023  Diagnosis: Femur fracture ([facility] [address] [S72.90XA] <principal problem not specified>  Procedure(s) (LRB):  OPEN REDUCTION INTERNAL FIXATION OF LEFT HIP (Left) 2 Days Post-Op  Precautions: FALL  In place during session: Peripheral IV and Mid-Flow O2 2L  Chart, occupational therapy assessment, plan of care, and goals were reviewed. ASSESSMENT  Pt continues with skilled OT services and is progressing towards goals. Pt received semi-supine in bed upon arrival, AXO x4 and agreeable to DE ANDA tx at this time. Pt cooperative and demonstrated good effort during activities. Pt reported just getting back to bed not wanting to sit EOB again. Pt req'd set-up for grooming. Patient educated on and completed bilateral UE HEP to increase strength/endurance needed for ADL'S and functional transfers, see grid below. Pt req'd vc's for pacing self and to achieve full ROM. Pt's pain is limiting participation in functional tf's this visit. Overall, pt continues to present with deficits in generalized strength/AROM, pain, static/dynamic standing balance and functional activity tolerance during performance of ADLs/mobility (see below for objective details and assist levels). Will continue to progress. Recommend d/c to IRF once medically appropriate. Other factors to consider for discharge: Time of onset, medical prognosis/diagnosis, severity of deficits, PLOF, functional baseline, home environment, and family support          PLAN :  Patient continues to benefit from skilled intervention to address the above impairments. Continue treatment per established plan of care. to address goals. Recommend with staff: Out of bed to chair for meals, Encourage HEP in prep for ADLs/mobility, and Frequent repositioning to prevent skin breakdown    Recommend next session:  Toileting    Recommendation for discharge: (in order for the patient to meet his/her long term goals)  Inpatient Rehabilitation Facility     This discharge recommendation:  Has been made in collaboration with the attending provider and/or case management       IF patient discharges home will need the following DME: TBD       SUBJECTIVE:   Patient stated I just got back to bed.     OBJECTIVE DATA SUMMARY:   Cognitive/Behavioral Status:  Neurologic State: Alert  Orientation Level: Oriented X4  Cognition: Follows commands             Functional Mobility and Transfers for ADLs:  Bed Mobility:       Transfers:             Balance:       ADL Intervention:       Grooming  Grooming Assistance: Set-up  Position Performed: Long sitting on bed  Washing Face: Set-up  Washing Hands: Set-up  Brushing Teeth: Set-up       Exercise Sets Reps AROM AAROM PROM Self PROM Comments   Chest press 3 15 [x] [] [] [] Long sitting, 1/2 lb wt(water bottle)   Ceiling punches 3 15 [x] [] [] []    Lateral raises 3 15 X       Arm circles; forwards/backwards 1 15 X            Pain:  4-5/10 L hip    Activity Tolerance:   Fair    After treatment patient left in no apparent distress:   Supine in bed, Call bell within reach, and Side rails x 3, bed locked and in lowest position    COMMUNICATION/COLLABORATION:   The patients plan of care was discussed with: Registered nurse. Erasmo Felty, COTA  Time Calculation: 23 mins     Problem: Self Care Deficits Care Plan (Adult)  Goal: *Acute Goals and Plan of Care (Insert Text)  Description:   FUNCTIONAL STATUS PRIOR TO ADMISSION: Patient was independent and active without use of DME. Patient required minimal assistance for basic and instrumental ADLs. HOME SUPPORT: The patient lived with his wife and required minimal assistance/contact guard assist for bathing and dressing. Occupational Therapy Goals  Initiated 2/13/2023  Patient Goal: Go to rehab. 1.  Patient will perform lower body dressing with modified independence within 7 day(s).   2.  Patient will perform grooming with modified independence within 7 day(s). 3.  Patient will perform bathing with modified independence within 7 day(s). 4.  Patient will perform toilet transfers with modified independence within 7 day(s). 5.  Patient will perform all aspects of toileting with modified independence within 7 day(s). 6.  Patient will participate in upper extremity therapeutic exercise/activities with independence within 7 day(s). 7.  Patient will utilize energy conservation techniques during functional activities with verbal cues within 7 day(s).   Outcome: Progressing Towards Goal

## 2023-02-14 NOTE — PROGRESS NOTES
Problem: Pressure Injury - Risk of  Goal: *Prevention of pressure injury  Description: Document Ector Scale and appropriate interventions in the flowsheet. Outcome: Progressing Towards Goal  Note: Pressure Injury Interventions:  Sensory Interventions: Assess changes in LOC, Keep linens dry and wrinkle-free, Minimize linen layers, Float heels, Pressure redistribution bed/mattress (bed type)    Moisture Interventions: Absorbent underpads, Minimize layers    Activity Interventions: Pressure redistribution bed/mattress(bed type)    Mobility Interventions: Pressure redistribution bed/mattress (bed type), Chair cushion    Nutrition Interventions: Document food/fluid/supplement intake    Friction and Shear Interventions: Minimize layers                Problem: Falls - Risk of  Goal: *Absence of Falls  Description: Document Davey Fall Risk and appropriate interventions in the flowsheet.   Outcome: Progressing Towards Goal  Note: Fall Risk Interventions:  Mobility Interventions: Bed/chair exit alarm, Patient to call before getting OOB, Communicate number of staff needed for ambulation/transfer         Medication Interventions: Bed/chair exit alarm, Patient to call before getting OOB    Elimination Interventions: Bed/chair exit alarm    History of Falls Interventions: Bed/chair exit alarm, Door open when patient unattended, Room close to nurse's station

## 2023-02-14 NOTE — PROGRESS NOTES
Hospitalist Progress Note            Daily Progress Note: 2/14/2023 12:06 PM  Hospital course:   Magali Trujillo is a 74 y.o. male with PMH of COPD and stroke with residue left leg dis-coordination. He presented to the ED with chief complaint of left hip pain after a mechanical fall. Denies dizziness, weakness or loss of consciousness prior or after the fall. Pain is severe intensity, worsened with movement. In the ED, initially hypotensive, improved with IV fluid resuscitation. Afebrile, no leukocytosis. Hip x-ray showed communicated displaced intertrochanteric fracture. Admitted for further management. Orthopedic surgery consulted. 2/12 ORIF performed. Noted hemoglobin decline postoperatively. We will continue to monitor. PT OT recommending IRF  CT Pel w/o contrast: 2/12: IMPRESSION:  1. Left femoral intertrochanteric comminuted fracture with varus angulation and displacement. No underlying bone lesion. 2. Sacroiliac joint ankylosis. 3. Right inguinal hernia. 2/13: HgB improved this am to 11.0. Plan for IRF when insurance authorization approved. Mr. Tru Chacko will need to follow-up with Dr. Ha Ayala at [place] [address] at [phone]. Subjective:   Mr. Tru Chacko seen on the side of the bed. He reports left leg pain and swelling in the knee area. He is working with PT/OT. Plan for IRF. Assessment/Plan:   Active Problems:    Femur fracture (HCC) (2/12/2023)      Left femur fracture  - Pain control, IV morphine for severe pain 2 mg every 4, Roxicodone 5 mg and 10 mg for mild to moderate pain  -Orthopedic surgery following-2/12 ORIF performed  - Hold anti-coagulant.   -Noted hemoglobin decline from 15.7 to 10.9, continue to monitor closely  -PT and OT-recommending IRF        Hyponatremia (Resolved)  -Gentle IV fluids overnight     COPD  - Not in exacerbation  - Continue home medications.    - DuoNeb PRN     Social Determents of health: None      DVT Prophylaxis: Heparin  Code Status: Full Code  POA/NOK:    Disposition and discharge barriers:   Insurance Authorization for [address] discussed with:     Current Facility-Administered Medications   Medication Dose Route Frequency    polyethylene glycol (MIRALAX) packet 17 g  17 g Oral DAILY PRN    ondansetron (ZOFRAN ODT) tablet 4 mg  4 mg Oral Q8H PRN    Or    ondansetron (ZOFRAN) injection 4 mg  4 mg IntraVENous Q6H PRN    albuterol-ipratropium (DUO-NEB) 2.5 MG-0.5 MG/3 ML  3 mL Nebulization Q6H PRN    budesonide-formoteroL (SYMBICORT) 160-4.5 mcg/actuation HFA inhaler 2 Puff  2 Puff Inhalation BID RT    tiotropium bromide (SPIRIVA RESPIMAT) 2.5 mcg /actuation  2 Puff Inhalation DAILY    acetaminophen (TYLENOL) tablet 1,000 mg  1,000 mg Oral Q6H    morphine injection 4 mg  4 mg IntraVENous Q3H PRN    sodium chloride (NS) flush 5-40 mL  5-40 mL IntraVENous Q8H    sodium chloride (NS) flush 5-40 mL  5-40 mL IntraVENous PRN    traMADoL (ULTRAM) tablet 50 mg  50 mg Oral Q6H PRN    oxyCODONE IR (ROXICODONE) tablet 2.5 mg  2.5 mg Oral Q4H PRN    naloxone (NARCAN) injection 0.4 mg  0.4 mg IntraVENous PRN    senna-docusate (PERICOLACE) 8.6-50 mg per tablet 1 Tablet  1 Tablet Oral BID    polyethylene glycol (MIRALAX) packet 17 g  17 g Oral DAILY    bisacodyL (DULCOLAX) suppository 10 mg  10 mg Rectal DAILY PRN    aspirin delayed-release tablet 325 mg  325 mg Oral BID        REVIEW OF SYSTEMS    Review of Systems   Constitutional:  Negative for chills, fever, malaise/fatigue and weight loss. HENT:  Negative for hearing loss. Respiratory:  Negative for cough, shortness of breath and wheezing. Cardiovascular:  Negative for chest pain and leg swelling. Gastrointestinal:  Negative for abdominal pain, constipation, diarrhea, heartburn, nausea and vomiting. Musculoskeletal:         Left leg swelling   Skin:  Negative for itching and rash. Neurological:  Negative for dizziness, tremors and headaches.       Objective:     Visit Vitals  BP 117/73 (BP 1 Location: Right upper arm, BP Patient Position: At rest)   Pulse 74   Temp 97.6 °F (36.4 °C)   Resp 16   Ht 6' 4\" (1.93 m)   Wt 86.2 kg (190 lb 0.6 oz)   SpO2 90%   BMI 23.13 kg/m²    O2 Flow Rate (L/min): 3 l/min O2 Device: Nasal cannula    Temp (24hrs), Av.1 °F (36.7 °C), Min:97.6 °F (36.4 °C), Max:98.6 °F (37 °C)        PHYSICAL EXAM:    Physical Exam  Constitutional:       Appearance: He is ill-appearing. HENT:      Head: Normocephalic and atraumatic. Cardiovascular:      Rate and Rhythm: Normal rate and regular rhythm. Heart sounds: No murmur heard. No friction rub. No gallop. Pulmonary:      Effort: Pulmonary effort is normal. No respiratory distress. Breath sounds: No wheezing, rhonchi or rales. Abdominal:      General: Bowel sounds are normal. There is no distension. Palpations: Abdomen is soft. Tenderness: There is no abdominal tenderness. There is no guarding. Musculoskeletal:      Right lower leg: No edema. Left lower leg: Edema present. Skin:     General: Skin is warm and dry. Coloration: Skin is pale. Neurological:      Mental Status: He is alert and oriented to person, place, and time. Psychiatric:         Mood and Affect: Mood normal.         Behavior: Behavior normal.         Thought Content:  Thought content normal.         Judgment: Judgment normal.        Data Review    Recent Results (from the past 24 hour(s))   GLUCOSE, POC    Collection Time: 23  9:13 PM   Result Value Ref Range    Glucose (POC) 117 (H) 65 - 100 mg/dL    Performed by Anna Carrera    GLUCOSE, POC    Collection Time: 23  5:30 AM   Result Value Ref Range    Glucose (POC) 97 65 - 100 mg/dL    Performed by Anna Carrera    GLUCOSE, POC    Collection Time: 23  8:22 AM   Result Value Ref Range    Glucose (POC) 101 (H) 65 - 100 mg/dL    Performed by C/ Radha Baer [address], BASIC    Collection Time: 23 10:38 AM   Result Value Ref Range    Sodium 136 136 - 145 mmol/L    Potassium 4.0 3.5 - 5.1 mmol/L    Chloride 103 97 - 108 mmol/L    CO2 31 21 - 32 mmol/L    Anion gap 2 (L) 5 - 15 mmol/L    Glucose 104 (H) 65 - 100 mg/dL    BUN 16 6 - 20 mg/dL    Creatinine 0.95 0.70 - 1.30 mg/dL    BUN/Creatinine ratio 17 12 - 20      eGFR >60 >60 ml/min/1.73m2    Calcium 9.0 8.5 - 10.1 mg/dL   CBC WITH AUTOMATED DIFF    Collection Time: 02/14/23 10:38 AM   Result Value Ref Range    WBC 7.7 4.1 - 11.1 K/uL    RBC 3.39 (L) 4.10 - 5.70 M/uL    HGB 11.0 (L) 12.1 - 17.0 g/dL    HCT 33.4 (L) 36.6 - 50.3 %    MCV 98.5 80.0 - 99.0 FL    MCH 32.4 26.0 - 34.0 PG    MCHC 32.9 30.0 - 36.5 g/dL    RDW 14.8 (H) 11.5 - 14.5 %    PLATELET 978 083 - 374 K/uL    MPV 10.2 8.9 - 12.9 FL    NRBC 0.0 0.0  WBC    ABSOLUTE NRBC 0.00 0.00 - 0.01 K/uL    NEUTROPHILS PENDING %    LYMPHOCYTES PENDING %    MONOCYTES PENDING %    EOSINOPHILS PENDING %    BASOPHILS PENDING %    IMMATURE GRANULOCYTES PENDING %    ABS. NEUTROPHILS PENDING K/UL    ABS. LYMPHOCYTES PENDING K/UL    ABS. MONOCYTES PENDING K/UL    ABS. EOSINOPHILS PENDING K/UL    ABS. BASOPHILS PENDING K/UL    ABS. IMM. GRANS. PENDING K/UL    DF PENDING        XR FEMUR LT 2 V   Final Result   Good alignment. XR FLUOROSCOPY UNDER 60 MINUTES   Final Result      CT PELV WO CONT   Final Result   1. Left femoral intertrochanteric comminuted fracture with varus angulation and   displacement. No underlying bone lesion. 2. Sacroiliac joint ankylosis. 3. Right inguinal hernia. XR CHEST PORT   Final Result   Bilateral increased interstitial markings. No focal airspace   process. XR HIP LT W OR WO PELV 2-3 VWS   Final Result         Comminuted and displaced intertrochanteric fracture. .          Intake and Output:  Current Shift: No intake/output data recorded. Last three shifts: No intake/output data recorded.       Lab/Data Review:  Recent Labs     02/14/23  1038 02/13/23  0607 02/11/23  2213   WBC 7.7  -- 6. 2   HGB 11.0* 10.9* 15.7   HCT 33.4*  --  46.9     --  204     Recent Labs     02/14/23  1038 02/13/23  0607 02/11/23  2213    133* 139   K 4.0 4.7 3.9    103 106   CO2 31 28 26   * 173* 96   BUN 16 22* 12   CREA 0.95 1.10 1.19   CA 9.0 8.2* 9.0   ALB  --   --  4.0   TBILI  --   --  0.4   ALT  --   --  59   INR  --   --  1.0     No results for input(s): PH, PCO2, PO2, HCO3, FIO2 in the last 72 hours.   Recent Results (from the past 24 hour(s))   GLUCOSE, POC    Collection Time: 02/13/23  9:13 PM   Result Value Ref Range    Glucose (POC) 117 (H) 65 - 100 mg/dL    Performed by Claudeen Precise    GLUCOSE, POC    Collection Time: 02/14/23  5:30 AM   Result Value Ref Range    Glucose (POC) 97 65 - 100 mg/dL    Performed by Claudeen Precise    GLUCOSE, POC    Collection Time: 02/14/23  8:22 AM   Result Value Ref Range    Glucose (POC) 101 (H) 65 - 100 mg/dL    Performed by Trae Carroll    METABOLIC PANEL, BASIC    Collection Time: 02/14/23 10:38 AM   Result Value Ref Range    Sodium 136 136 - 145 mmol/L    Potassium 4.0 3.5 - 5.1 mmol/L    Chloride 103 97 - 108 mmol/L    CO2 31 21 - 32 mmol/L    Anion gap 2 (L) 5 - 15 mmol/L    Glucose 104 (H) 65 - 100 mg/dL    BUN 16 6 - 20 mg/dL    Creatinine 0.95 0.70 - 1.30 mg/dL    BUN/Creatinine ratio 17 12 - 20      eGFR >60 >60 ml/min/1.73m2    Calcium 9.0 8.5 - 10.1 mg/dL   CBC WITH AUTOMATED DIFF    Collection Time: 02/14/23 10:38 AM   Result Value Ref Range    WBC 7.7 4.1 - 11.1 K/uL    RBC 3.39 (L) 4.10 - 5.70 M/uL    HGB 11.0 (L) 12.1 - 17.0 g/dL    HCT 33.4 (L) 36.6 - 50.3 %    MCV 98.5 80.0 - 99.0 FL    MCH 32.4 26.0 - 34.0 PG    MCHC 32.9 30.0 - 36.5 g/dL    RDW 14.8 (H) 11.5 - 14.5 %    PLATELET 984 119 - 557 K/uL    MPV 10.2 8.9 - 12.9 FL    NRBC 0.0 0.0  WBC    ABSOLUTE NRBC 0.00 0.00 - 0.01 K/uL    NEUTROPHILS PENDING %    LYMPHOCYTES PENDING %    MONOCYTES PENDING %    EOSINOPHILS PENDING %    BASOPHILS PENDING %    IMMATURE GRANULOCYTES PENDING %    ABS. NEUTROPHILS PENDING K/UL    ABS. LYMPHOCYTES PENDING K/UL    ABS. MONOCYTES PENDING K/UL    ABS. EOSINOPHILS PENDING K/UL    ABS. BASOPHILS PENDING K/UL    ABS. IMM. GRANS. PENDING K/UL    DF PENDING            _____________________________________________________________________________  Time spent in direct care including coordination of service, review of data and examination: > 35 minutes    ______________________________________________________________________________    Sara Peguero NP    This is dictation was done by dragon, computer voice recognition software. Quite often unanticipated grammatical, syntax, homophones and other interpretive errors or inadvertently transcribed by the computer software. Please excuse errors that have escaped final proofreading. Thank you.

## 2023-02-14 NOTE — PROGRESS NOTES
DC Plan: [address] Lilian Garces pending)  Ref# [id number]    [id number]    Cm attempted to contact pt's wife - Jeyson Adams [phone]. Cm left a voice message. ~1100    Cm stopped by pt's room and informed him writer was not able to get a hold of his wife. Pt called his wife on his cell phone a couple times until she answered the phone. Pt had his cell phone on speaker and handed his phone to writer. Cm informed pt's wife PT/OT's recommendation for IRF. Cm discussed and educated IRF. Wife indicated she spoke with pt's insurance and was informed they are in network with [address]. Wife is aware of other inpatient rehab facilities, but she indicated they were in [facility] and too far away for her. Pt and wife is aware [facility] will need to obtain auth from insurance. Wife informed Cm that someone from the hospital needs to call pt's insurance for pre-auth so they can cover for his stay here. Wife is concerned that insurance will deny the pt's stay in the hospital and they will be billed for pt's stay because no one from the hospital got in touch with pt's insurance to provide them what they need. CM informed wife that [employer] deals with insurance. Wife indicated she needs someone to get a hold of pt's insurance today. Cm informed wife CM will send a message to [facility]. Wife would like Cm to call her to give her an update. Cm sent a message via [employer] to CM Director to see who writer needs to reach out to from [facility]. CM Director provided Olga Gottron with [facility] At[employer] e-mail. Cm e-mailed [employer]. Referral made via [employer] to [facility]. [address] Director via [facility]. Jono has not received a response back from [employer] (TTN). 46    Cm received a message via [facility] from [address]. [employer] did get in touch with pt's insurance. [address] attempted to contact pt's wife - Jeyson Adams [phone].  CM left a voice message. Jono stopped by pt's room and informed him writer tried to get a hold of his wife. Jono informed pt [employer] reached out to his insurance. Cm asked pt to pass information along to his wife.

## 2023-02-14 NOTE — PROGRESS NOTES
Problem: Pressure Injury - Risk of  Goal: *Prevention of pressure injury  Description: Document Ector Scale and appropriate interventions in the flowsheet. 2/14/2023 0912 by Anabel Schrader RN  Outcome: Progressing Towards Goal  Note: Pressure Injury Interventions:  Sensory Interventions: Assess changes in LOC, Keep linens dry and wrinkle-free, Minimize linen layers, Float heels, Pressure redistribution bed/mattress (bed type)    Moisture Interventions: Absorbent underpads, Minimize layers    Activity Interventions: Pressure redistribution bed/mattress(bed type)    Mobility Interventions: Pressure redistribution bed/mattress (bed type), Chair cushion    Nutrition Interventions: Document food/fluid/supplement intake    Friction and Shear Interventions: Minimize layers             2/14/2023 0838 by Anabel Schrader RN  Outcome: Progressing Towards Goal  Note: Pressure Injury Interventions:  Sensory Interventions: Assess changes in LOC, Keep linens dry and wrinkle-free, Minimize linen layers, Float heels, Pressure redistribution bed/mattress (bed type)    Moisture Interventions: Absorbent underpads, Minimize layers    Activity Interventions: Pressure redistribution bed/mattress(bed type)    Mobility Interventions: Pressure redistribution bed/mattress (bed type), Chair cushion    Nutrition Interventions: Document food/fluid/supplement intake    Friction and Shear Interventions: Minimize layers                Problem: Falls - Risk of  Goal: *Absence of Falls  Description: Document Davey Fall Risk and appropriate interventions in the flowsheet.   2/14/2023 0912 by Anabel Schrader RN  Outcome: Progressing Towards Goal  Note: Fall Risk Interventions:  Mobility Interventions: Bed/chair exit alarm, Patient to call before getting OOB, Communicate number of staff needed for ambulation/transfer         Medication Interventions: Bed/chair exit alarm, Patient to call before getting OOB    Elimination Interventions: Bed/chair exit alarm    History of Falls Interventions: Bed/chair exit alarm, Door open when patient unattended, Room close to nurse's station      2/14/2023 0838 by Jm Amaro RN  Outcome: Progressing Towards Goal  Note: Fall Risk Interventions:  Mobility Interventions: Bed/chair exit alarm, Patient to call before getting OOB, Communicate number of staff needed for ambulation/transfer         Medication Interventions: Bed/chair exit alarm, Patient to call before getting OOB    Elimination Interventions: Bed/chair exit alarm    History of Falls Interventions: Bed/chair exit alarm, Door open when patient unattended, Room close to nurse's station

## 2023-02-15 VITALS
DIASTOLIC BLOOD PRESSURE: 66 MMHG | HEIGHT: 76 IN | WEIGHT: 190.04 LBS | RESPIRATION RATE: 17 BRPM | BODY MASS INDEX: 23.14 KG/M2 | SYSTOLIC BLOOD PRESSURE: 108 MMHG | TEMPERATURE: 98.4 F | HEART RATE: 80 BPM | OXYGEN SATURATION: 95 %

## 2023-02-15 PROBLEM — J44.9 COPD (CHRONIC OBSTRUCTIVE PULMONARY DISEASE) (HCC): Status: ACTIVE | Noted: 2023-02-15

## 2023-02-15 LAB
ANION GAP SERPL CALC-SCNC: 3 MMOL/L (ref 5–15)
BASOPHILS # BLD: 0 K/UL (ref 0–0.1)
BASOPHILS NFR BLD: 1 % (ref 0–1)
BUN SERPL-MCNC: 13 MG/DL (ref 6–20)
BUN/CREAT SERPL: 15 (ref 12–20)
CA-I BLD-MCNC: 9 MG/DL (ref 8.5–10.1)
CHLORIDE SERPL-SCNC: 100 MMOL/L (ref 97–108)
CO2 SERPL-SCNC: 33 MMOL/L (ref 21–32)
CREAT SERPL-MCNC: 0.84 MG/DL (ref 0.7–1.3)
DIFFERENTIAL METHOD BLD: ABNORMAL
EOSINOPHIL # BLD: 0.2 K/UL (ref 0–0.4)
EOSINOPHIL NFR BLD: 3 % (ref 0–7)
ERYTHROCYTE [DISTWIDTH] IN BLOOD BY AUTOMATED COUNT: 15.2 % (ref 11.5–14.5)
GLUCOSE SERPL-MCNC: 154 MG/DL (ref 65–100)
HCT VFR BLD AUTO: 30.6 % (ref 36.6–50.3)
HGB BLD-MCNC: 9.9 G/DL (ref 12.1–17)
IMM GRANULOCYTES # BLD AUTO: 0 K/UL (ref 0–0.04)
IMM GRANULOCYTES NFR BLD AUTO: 1 % (ref 0–0.5)
LYMPHOCYTES # BLD: 0.6 K/UL (ref 0.8–3.5)
LYMPHOCYTES NFR BLD: 9 % (ref 12–49)
MCH RBC QN AUTO: 32 PG (ref 26–34)
MCHC RBC AUTO-ENTMCNC: 32.4 G/DL (ref 30–36.5)
MCV RBC AUTO: 99 FL (ref 80–99)
MONOCYTES # BLD: 0.6 K/UL (ref 0–1)
MONOCYTES NFR BLD: 10 % (ref 5–13)
NEUTS SEG # BLD: 4.6 K/UL (ref 1.8–8)
NEUTS SEG NFR BLD: 76 % (ref 32–75)
NRBC # BLD: 0 K/UL (ref 0–0.01)
NRBC BLD-RTO: 0 PER 100 WBC
PLATELET # BLD AUTO: 160 K/UL (ref 150–400)
PMV BLD AUTO: 10.3 FL (ref 8.9–12.9)
POTASSIUM SERPL-SCNC: 4.1 MMOL/L (ref 3.5–5.1)
RBC # BLD AUTO: 3.09 M/UL (ref 4.1–5.7)
SODIUM SERPL-SCNC: 136 MMOL/L (ref 136–145)
WBC # BLD AUTO: 6 K/UL (ref 4.1–11.1)

## 2023-02-15 PROCEDURE — 97110 THERAPEUTIC EXERCISES: CPT

## 2023-02-15 PROCEDURE — 74011250636 HC RX REV CODE- 250/636: Performed by: NURSE PRACTITIONER

## 2023-02-15 PROCEDURE — 74011250637 HC RX REV CODE- 250/637: Performed by: ORTHOPAEDIC SURGERY

## 2023-02-15 PROCEDURE — 36415 COLL VENOUS BLD VENIPUNCTURE: CPT

## 2023-02-15 PROCEDURE — 85025 COMPLETE CBC W/AUTO DIFF WBC: CPT

## 2023-02-15 PROCEDURE — 97116 GAIT TRAINING THERAPY: CPT

## 2023-02-15 PROCEDURE — 80048 BASIC METABOLIC PNL TOTAL CA: CPT

## 2023-02-15 PROCEDURE — 74011000250 HC RX REV CODE- 250: Performed by: ORTHOPAEDIC SURGERY

## 2023-02-15 PROCEDURE — 77010033678 HC OXYGEN DAILY

## 2023-02-15 PROCEDURE — 94640 AIRWAY INHALATION TREATMENT: CPT

## 2023-02-15 PROCEDURE — 97530 THERAPEUTIC ACTIVITIES: CPT

## 2023-02-15 PROCEDURE — 94761 N-INVAS EAR/PLS OXIMETRY MLT: CPT

## 2023-02-15 RX ORDER — FACIAL-BODY WIPES
10 EACH TOPICAL
Qty: 30 SUPPOSITORY | Refills: 0 | Status: SHIPPED | OUTPATIENT
Start: 2023-02-15 | End: 2023-03-17

## 2023-02-15 RX ORDER — ASPIRIN 325 MG
325 TABLET, DELAYED RELEASE (ENTERIC COATED) ORAL 2 TIMES DAILY
Qty: 60 TABLET | Refills: 0 | Status: SHIPPED | OUTPATIENT
Start: 2023-02-15 | End: 2023-03-17

## 2023-02-15 RX ORDER — IPRATROPIUM BROMIDE AND ALBUTEROL SULFATE 2.5; .5 MG/3ML; MG/3ML
3 SOLUTION RESPIRATORY (INHALATION)
Qty: 120 EACH | Refills: 0 | Status: SHIPPED | OUTPATIENT
Start: 2023-02-15 | End: 2023-03-17

## 2023-02-15 RX ORDER — OXYCODONE HYDROCHLORIDE 5 MG/1
5 TABLET ORAL
Qty: 12 TABLET | Refills: 0 | Status: SHIPPED | OUTPATIENT
Start: 2023-02-15 | End: 2023-02-18

## 2023-02-15 RX ORDER — AMOXICILLIN 250 MG
1 CAPSULE ORAL 2 TIMES DAILY
Qty: 60 TABLET | Refills: 0 | Status: SHIPPED | OUTPATIENT
Start: 2023-02-15 | End: 2023-03-17

## 2023-02-15 RX ORDER — TRAMADOL HYDROCHLORIDE 50 MG/1
50 TABLET ORAL
Qty: 9 TABLET | Refills: 0 | Status: SHIPPED | OUTPATIENT
Start: 2023-02-15 | End: 2023-02-18

## 2023-02-15 RX ORDER — LANOLIN ALCOHOL/MO/W.PET/CERES
1 CREAM (GRAM) TOPICAL 2 TIMES DAILY WITH MEALS
Status: DISCONTINUED | OUTPATIENT
Start: 2023-02-15 | End: 2023-02-15 | Stop reason: HOSPADM

## 2023-02-15 RX ORDER — LANOLIN ALCOHOL/MO/W.PET/CERES
325 CREAM (GRAM) TOPICAL 2 TIMES DAILY WITH MEALS
Qty: 60 TABLET | Refills: 0 | Status: SHIPPED | OUTPATIENT
Start: 2023-02-15 | End: 2023-03-17

## 2023-02-15 RX ADMIN — HEPARIN SODIUM 5000 UNITS: 5000 INJECTION INTRAVENOUS; SUBCUTANEOUS at 01:16

## 2023-02-15 RX ADMIN — HEPARIN SODIUM 5000 UNITS: 5000 INJECTION INTRAVENOUS; SUBCUTANEOUS at 12:00

## 2023-02-15 RX ADMIN — ACETAMINOPHEN 1000 MG: 500 TABLET ORAL at 05:25

## 2023-02-15 RX ADMIN — ASPIRIN 325 MG: 325 TABLET, COATED ORAL at 08:14

## 2023-02-15 RX ADMIN — SODIUM CHLORIDE, PRESERVATIVE FREE 10 ML: 5 INJECTION INTRAVENOUS at 13:11

## 2023-02-15 RX ADMIN — BUDESONIDE AND FORMOTEROL FUMARATE DIHYDRATE 2 PUFF: 160; 4.5 AEROSOL RESPIRATORY (INHALATION) at 07:42

## 2023-02-15 RX ADMIN — TIOTROPIUM BROMIDE INHALATION SPRAY 2 PUFF: 3.12 SPRAY, METERED RESPIRATORY (INHALATION) at 07:42

## 2023-02-15 RX ADMIN — ACETAMINOPHEN 1000 MG: 500 TABLET ORAL at 11:58

## 2023-02-15 RX ADMIN — TRAMADOL HYDROCHLORIDE 50 MG: 50 TABLET, COATED ORAL at 05:26

## 2023-02-15 RX ADMIN — IPRATROPIUM BROMIDE AND ALBUTEROL SULFATE 3 ML: 2.5; .5 SOLUTION RESPIRATORY (INHALATION) at 07:41

## 2023-02-15 RX ADMIN — ACETAMINOPHEN 1000 MG: 500 TABLET ORAL at 01:16

## 2023-02-15 RX ADMIN — SODIUM CHLORIDE, PRESERVATIVE FREE 10 ML: 5 INJECTION INTRAVENOUS at 05:36

## 2023-02-15 NOTE — PROGRESS NOTES
OCCUPATIONAL THERAPY TREATMENT  Patient: Hipolito Mcmanus (70 y.o. male)  Date: 2/15/2023  Diagnosis: Femur fracture ([address] [S72.90XA] <principal problem not specified>  Procedure(s) (LRB):  OPEN REDUCTION INTERNAL FIXATION OF LEFT HIP (Left) 3 Days Post-Op  Precautions: FALL  In place during session: Nasal Cannula 3L and EKG/telemetry   Chart, occupational therapy assessment, plan of care, and goals were reviewed. ASSESSMENT  Pt continues with skilled OT services and is progressing towards goals. Pt received standing EOB upon arrival with PTA, AXO x4 and agreeable to DE ANDA tx at this time. Pt cooperative and demonstrated good effort during activities. With additional time, pt ambulated away from bed with min A x1-2 for balance with frequent standing rest breaks d/t pain. Seated EOB, pt completed simple grooming with good dynamic sitting balance. Pt instructed on theraband exercises EOB with vc's for correct technique to improve strength/endurance for ADL performance. Pt limited by pain/decreased activity tolerance that interferes with performance with functional tf's. Overall, pt continues to present with deficits in generalized strength/AROM, static/dynamic standing balance, pain and functional activity tolerance during performance of ADLs/mobility (see below for objective details and assist levels). Will continue to progress. Recommend d/c to IRF once medically appropriate. Other factors to consider for discharge: Time of onset, medical prognosis/diagnosis, severity of deficits, PLOF, functional baseline, home environment, and family support          PLAN :  Patient continues to benefit from skilled intervention to address the above impairments. Continue treatment per established plan of care. to address goals. Recommend with staff: Encourage HEP in prep for ADLs/mobility and Frequent repositioning to prevent skin breakdown    Recommend next session:  Toileting    Recommendation for discharge: (in order for the patient to meet his/her long term goals)  [address]     This discharge recommendation:  Has been made in collaboration with the attending provider and/or case management       IF patient discharges home will need the following DME: TBD       SUBJECTIVE:   Patient stated It hurts too bad.     OBJECTIVE DATA SUMMARY:   Cognitive/Behavioral Status:  Neurologic State: Alert  Orientation Level: Oriented X4  Cognition: Appropriate decision making             Functional Mobility and Transfers for ADLs:  Bed Mobility:      Transfers:  Sit to Stand: Minimum assistance          Balance:  Sitting: Intact; Without support  Standing: With support  Standing - Static: Constant support; Fair  Standing - Dynamic : Constant support; Fair    ADL Intervention:       Grooming  Grooming Assistance: Set-up  Position Performed: Seated edge of bed  Washing Face: Set-up  Washing Hands: Set-up  Brushing Teeth: Set-up         Exercise Sets Reps AROM AAROM PROM Self PROM Comments   Shoulder ABD/ADD 1 12 [] [] [] [] Theraband ex. Shoulder flex/ext 1 12     Theraband (red), rest breaks   Ceiling punches 1 12 [] [] [] []         Pain:  6-7/10 LLE    Activity Tolerance:   Fair and requires rest breaks    After treatment patient left in no apparent distress:   Call bell within reach, bed locked and in lowest position    COMMUNICATION/COLLABORATION:   The patients plan of care was discussed with: Physical therapy assistant and Registered nurse. Co-tx with PTA for increased pt/clinician safety with OOB activity for pain and activity tolerance    NEETU Roldan  Time Calculation: 24 mins     Problem: Self Care Deficits Care Plan (Adult)  Goal: *Acute Goals and Plan of Care (Insert Text)  Description:   FUNCTIONAL STATUS PRIOR TO ADMISSION: Patient was independent and active without use of DME. Patient required minimal assistance for basic and instrumental ADLs.      HOME SUPPORT: The patient lived with his wife and required minimal assistance/contact guard assist for bathing and dressing. Occupational Therapy Goals  Initiated 2/13/2023  Patient Goal: Go to rehab. 1.  Patient will perform lower body dressing with modified independence within 7 day(s). 2.  Patient will perform grooming with modified independence within 7 day(s). 3.  Patient will perform bathing with modified independence within 7 day(s). 4.  Patient will perform toilet transfers with modified independence within 7 day(s). 5.  Patient will perform all aspects of toileting with modified independence within 7 day(s). 6.  Patient will participate in upper extremity therapeutic exercise/activities with independence within 7 day(s). 7.  Patient will utilize energy conservation techniques during functional activities with verbal cues within 7 day(s).   Outcome: Progressing Towards Goal

## 2023-02-15 NOTE — PROGRESS NOTES
Patient medically cleared for discharge. Transport through [facility] set up for 3PM to [facility]. Patient's belongings have been packed up for transport to the facility. IV will be removed prior to discharge. Report called to Sal Gupta RN at [facility]. Patient discharging with paper prescriptions and Melani Kyle has been notified. Patient currently awaiting transport for 3PM.    1414 - PIV has been removed. Patient has been dressed in home clothing for transport to [facility].  [facility] transport still set for 3PM.

## 2023-02-15 NOTE — PROGRESS NOTES
[facility] transport has arrived at the bedside to transport patient to  in [place]. All belongings have been sent with the patient and necessary paperwork with the transporters.

## 2023-02-15 NOTE — CONSULTS
Rehab Consult    Patient: Eduar Augustin MRN: [id number]  SSN: [id number]    YOB: 1955  Age: 74 y.o. Sex: male      Consulting provider: Shakeel Grier NP  Reason for consult: Evaluate for rehab needs     Admit date: 2/11/2023  LOS (days): 3    CC: Difficulty walking     Subjective: This is a 74 y.o. male with a past medical history including COPD, CVA with residual left leg discoordination. Patient initially presented to [facility] after he had a ground-level fall and left hip pain. Patient was found to have a displaced left femoral intertrochanteric comminuted fracture. Orthopedics was consulted where patient underwent ORIF of left hip with Dr. Lorne Block. Patient progressed medically during hospital stay noted some postop anemia, pain currently controlled. Patient seen today while he sitting up on side of the bed discussed post hospital needs. Functional History -   Baseline: Independent with mobility/transfers/ADLs. Current: Patient currently functioning at a min to mod assist for his transfers, ADLs, mobility. Was able to ambulate 5 feet with a rolling walker. Living situation: Lives with spouse at house. No past medical history on file. No past surgical history on file. No family history on file.   Social History     Tobacco Use    Smoking status: Not on file    Smokeless tobacco: Not on file   Substance Use Topics    Alcohol use: Not on file      Current Facility-Administered Medications   Medication    ferrous sulfate tablet 325 mg    heparin (porcine) injection 5,000 Units    polyethylene glycol (MIRALAX) packet 17 g    ondansetron (ZOFRAN ODT) tablet 4 mg    Or    ondansetron (ZOFRAN) injection 4 mg    albuterol-ipratropium (DUO-NEB) 2.5 MG-0.5 MG/3 ML    budesonide-formoteroL (SYMBICORT) 160-4.5 mcg/actuation HFA inhaler 2 Puff    tiotropium bromide (SPIRIVA RESPIMAT) 2.5 mcg /actuation    acetaminophen (TYLENOL) tablet 1,000 mg    sodium chloride (NS) flush 5-40 mL    sodium chloride (NS) flush 5-40 mL    traMADoL (ULTRAM) tablet 50 mg    oxyCODONE IR (ROXICODONE) tablet 2.5 mg    naloxone (NARCAN) injection 0.4 mg    senna-docusate (PERICOLACE) 8.6-50 mg per tablet 1 Tablet    polyethylene glycol (MIRALAX) packet 17 g    bisacodyL (DULCOLAX) suppository 10 mg    aspirin delayed-release tablet 325 mg        No Known Allergies    Review of Systems:  Positive for fatigue. Otherwise a complete review of systems was negative except as noted above in HPI.      Objective:     Vitals:    02/14/23 2250 02/15/23 0122 02/15/23 0734 02/15/23 0757   BP:  103/64  108/66   Pulse:  83  80   Resp:  18  17   Temp:  98.3 °F (36.8 °C)  98.4 °F (36.9 °C)   SpO2: 97% 95% 95% 95%   Weight:       Height:            Physical Exam:  General: NAD, pleasant and cooperative   HEENT: anicteric, moist oral mucosa   CV: RRR, no murmurs, 2+ pulses   Respiratory: clear and aerating well, no increased WOB  Abdomen: nondistended, nontender  Extremities: no peripheral edema, dressing in place  Musculoskeletal: moving extremities at least antigravity, LLE weaknes 3/5  Neuro: alert, normal speech, CN 2-12 intact, sensation intact to light touch     Labs:  Recent Results (from the past 24 hour(s))   GLUCOSE, POC    Collection Time: 02/14/23 12:19 PM   Result Value Ref Range    Glucose (POC) 103 (H) 65 - 100 mg/dL    Performed by Melissa Barrett    CBC WITH AUTOMATED DIFF    Collection Time: 02/15/23  9:00 AM   Result Value Ref Range    WBC 6.0 4.1 - 11.1 K/uL    RBC 3.09 (L) 4.10 - 5.70 M/uL    HGB 9.9 (L) 12.1 - 17.0 g/dL    HCT 30.6 (L) 36.6 - 50.3 %    MCV 99.0 80.0 - 99.0 FL    MCH 32.0 26.0 - 34.0 PG    MCHC 32.4 30.0 - 36.5 g/dL    RDW 15.2 (H) 11.5 - 14.5 %    PLATELET 766 534 - 741 K/uL    MPV 10.3 8.9 - 12.9 FL    NRBC 0.0 0.0  WBC    ABSOLUTE NRBC 0.00 0.00 - 0.01 K/uL    NEUTROPHILS 76 (H) 32 - 75 %    LYMPHOCYTES 9 (L) 12 - 49 %    MONOCYTES 10 5 - 13 %    EOSINOPHILS 3 0 - 7 %    BASOPHILS 1 0 - 1 %    IMMATURE GRANULOCYTES 1 (H) 0 - 0.5 %    ABS. NEUTROPHILS 4.6 1.8 - 8.0 K/UL    ABS. LYMPHOCYTES 0.6 (L) 0.8 - 3.5 K/UL    ABS. MONOCYTES 0.6 0.0 - 1.0 K/UL    ABS. EOSINOPHILS 0.2 0.0 - 0.4 K/UL    ABS. BASOPHILS 0.0 0.0 - 0.1 K/UL    ABS. IMM. GRANS. 0.0 0.00 - 0.04 K/UL    DF AUTOMATED     METABOLIC PANEL, BASIC    Collection Time: 02/15/23  9:00 AM   Result Value Ref Range    Sodium 136 136 - 145 mmol/L    Potassium 4.1 3.5 - 5.1 mmol/L    Chloride 100 97 - 108 mmol/L    CO2 33 (H) 21 - 32 mmol/L    Anion gap 3 (L) 5 - 15 mmol/L    Glucose 154 (H) 65 - 100 mg/dL    BUN 13 6 - 20 mg/dL    Creatinine 0.84 0.70 - 1.30 mg/dL    BUN/Creatinine ratio 15 12 - 20      eGFR >60 >60 ml/min/1.73m2    Calcium 9.0 8.5 - 10.1 mg/dL         Imaging:  XR HIP LT W OR WO PELV 2-3 VWS    Result Date: 2/11/2023  Comminuted and displaced intertrochanteric fracture. .    XR FEMUR LT 2 V    Result Date: 2/12/2023  Good alignment. CT PELV WO CONT    Result Date: 2/12/2023  1. Left femoral intertrochanteric comminuted fracture with varus angulation and displacement. No underlying bone lesion. 2. Sacroiliac joint ankylosis. 3. Right inguinal hernia. XR CHEST PORT    Result Date: 2/12/2023  Bilateral increased interstitial markings. No focal airspace process. Impression/Plan:     Diagnoses:     Displaced left femoral intertrochanteric comminuted fracture  Status post ORIF  History of CVA  COPD  Postop anemia    This patient would benefit from participation in an intensive inpatient rehabilitation program.     This patient can likely tolerate 3 hours of therapy per day. Discussed with patient. Barriers to rehab: insurance auth    [ x ] Will need submission for insurance authorization for inpatient rehab. Continue PT/OT in the acute setting. Will continue to follow.        Signed By: Jaqueline Bey NP     February 15, 2023    Physical Medicine and Rehabilitation

## 2023-02-15 NOTE — PROGRESS NOTES
DC Plan: [facility]       Report: [phone]       Transportation: 4:45-5pm via stretcher with [facility]    Pt was approved for inpatient rehab. [facility] is requesting a 3pm transport . Cm spoke with attending and informed her [facility] received auth. Labs are pending for discharge. CM spoke with pt's nurse.  called [facility] and set up transport for a 3pm . CM stopped by pt's room. Cm informed pt he was approved to go to [facility] rehab. Cm explained to pt discharge is pending his labs. Cm informed pt transportation was arranged for a 3pm pickup. Pt voiced understanding. Cm informed pt Cm will try to call his wife to update her. [phone]    Cm attempted to contact pt's wife - Samantha Must [phone]. Cm left a voice message. [phone]    Cm attempted to contact pt's wife - Samantha Must. CM left another voice message. _________________________________________________    Cm was informed pt's transportation time was moved. ETA for  is 4:45-5pm    Cm spoke with Q, liaison for [facility] and updated her on transportation  time. Cm met with pt at the bedside and updated him. 1515    CM was informed Dr. Natacha Troncoso would like an update on this patient. Cm spoke with Dr. Natacha Troncoso and updated him on pt's discharge. Discharge plan of care/case management plan validated with provider's discharge order.

## 2023-02-15 NOTE — PROGRESS NOTES
Problem: Pressure Injury - Risk of  Goal: *Prevention of pressure injury  Description: Document Ector Scale and appropriate interventions in the flowsheet. Outcome: Progressing Towards Goal  Note: Pressure Injury Interventions:  Sensory Interventions: Assess changes in LOC, Keep linens dry and wrinkle-free, Float heels, Minimize linen layers, Pressure redistribution bed/mattress (bed type)    Moisture Interventions: Absorbent underpads, Apply protective barrier, creams and emollients, Minimize layers    Activity Interventions: Pressure redistribution bed/mattress(bed type)    Mobility Interventions: Pressure redistribution bed/mattress (bed type)    Nutrition Interventions: Document food/fluid/supplement intake    Friction and Shear Interventions: Minimize layers                Problem: Falls - Risk of  Goal: *Absence of Falls  Description: Document Davey Fall Risk and appropriate interventions in the flowsheet.   Outcome: Progressing Towards Goal  Note: Fall Risk Interventions:  Mobility Interventions: Bed/chair exit alarm, Patient to call before getting OOB, Communicate number of staff needed for ambulation/transfer         Medication Interventions: Bed/chair exit alarm, Patient to call before getting OOB    Elimination Interventions: Bed/chair exit alarm, Call light in reach    History of Falls Interventions: Bed/chair exit alarm, Room close to nurse's station

## 2023-02-15 NOTE — PROGRESS NOTES
Problem: Pressure Injury - Risk of  Goal: *Prevention of pressure injury  Description: Document Ector Scale and appropriate interventions in the flowsheet. Outcome: Progressing Towards Goal  Note: Pressure Injury Interventions:  Sensory Interventions: Assess changes in LOC, Keep linens dry and wrinkle-free, Minimize linen layers, Float heels, Pressure redistribution bed/mattress (bed type)    Moisture Interventions: Absorbent underpads, Minimize layers    Activity Interventions: Pressure redistribution bed/mattress(bed type)    Mobility Interventions: Pressure redistribution bed/mattress (bed type), Chair cushion    Nutrition Interventions: Document food/fluid/supplement intake    Friction and Shear Interventions: Minimize layers                Problem: Patient Education: Go to Patient Education Activity  Goal: Patient/Family Education  Outcome: Progressing Towards Goal     Problem: Falls - Risk of  Goal: *Absence of Falls  Description: Document Davey Fall Risk and appropriate interventions in the flowsheet.   Outcome: Progressing Towards Goal  Note: Fall Risk Interventions:  Mobility Interventions: Bed/chair exit alarm, Patient to call before getting OOB, Communicate number of staff needed for ambulation/transfer         Medication Interventions: Bed/chair exit alarm, Patient to call before getting OOB    Elimination Interventions: Bed/chair exit alarm    History of Falls Interventions: Bed/chair exit alarm, Door open when patient unattended, Room close to nurse's station         Problem: Patient Education: Go to Patient Education Activity  Goal: Patient/Family Education  Outcome: Progressing Towards Goal     Problem: Patient Education: Go to Patient Education Activity  Goal: Patient/Family Education  Outcome: Progressing Towards Goal     Problem: Patient Education: Go to Patient Education Activity  Goal: Patient/Family Education  Outcome: Progressing Towards Goal

## 2023-02-15 NOTE — PROGRESS NOTES
PHYSICAL THERAPY TREATMENT  Patient: Obey Wheeler (47 y.o. male)  Date: 2/15/2023  Diagnosis: Femur fracture ([facility] [address] [S72.90XA] <principal problem not specified>  Procedure(s) (LRB):  OPEN REDUCTION INTERNAL FIXATION OF LEFT HIP (Left) 3 Days Post-Op  Precautions: In place during session: Nasal Cannula 3L  Chart, physical therapy assessment, plan of care and goals were reviewed. ASSESSMENT  Patient continues with skilled PT services and is progressing towards goals. Pt seated in the bed with upon PT arrival, agreeable to session. Patient req mod A to assist with moving LEs to the EOB. Patient required BLEs be moved at the same time, speed, and elevation to the EOB as pt reports L side spasms if done separately. Patient demos good UE strength and mobility with maneuvering to EOB. Patient min A to stand and amb a few feet. Pt initially required hands to manually move LLE forward for a step but was able to advance without physical assist the last couple steps. Quick fatigue and pain noted during activity and limited ambulation. Pt demos LLE weakness and poor coordination. Pt will benefit from IRF at d/c to improve safety and deficits. (See below for objective details and assist levels). Overall, pt tolerated session well today with improved mobility noted overall. Will continue to benefit from skilled PT services, and will continue to progress as tolerated. Current Level of Function Impacting Discharge (mobility/balance): safety, min A x1-2    Other factors to consider for discharge: weakness, unsteady mobility, limited ambulation         PLAN :  Patient continues to benefit from skilled intervention to address the above impairments. Continue treatment per established plan of care to address goals.       Recommendation for discharge: (in order for the patient to meet his/her long term goals)  [address] [facility]     This discharge recommendation:  Has been made in collaboration with the attending provider and/or case management    IF patient discharges home will need the following DME: to be determined (TBD)       SUBJECTIVE:   Patient stated I can't walk.     OBJECTIVE DATA SUMMARY:   Critical Behavior:  Neurologic State: Alert  Orientation Level: Oriented X4  Cognition: Appropriate decision making  Safety/Judgement: Decreased insight into deficits  Functional Mobility Training:  Bed Mobility:     Supine to Sit: Moderate assistance (needs assist with BLEs)  Scooting: Stand-by assistance (to EOB)        Transfers:  Sit to Stand: Minimum assistance  Stand to Sit: Minimum assistance;Assist x2       Balance:  Sitting: Intact; Without support  Standing: With support  Standing - Static: Constant support; Fair  Standing - Dynamic : Constant support; Fair  Ambulation/Gait Training:  Distance (ft): 5 Feet (ft)  Assistive Device: Gait belt;Walker, rolling  Ambulation - Level of Assistance: Minimal assistance  Gait Abnormalities: Antalgic  Speed/Yashira: Shuffled; Slow  Step Length: Left shortened;Right shortened    Therapeutic Exercises:       EXERCISE   Sets   Reps   Active Active Assist   Passive Self ROM   Comments   Ankle Pumps  10 [x] [] [] [] L side   Long Arc Quads  10 [x] [] [] [] Decreased ROM L side    Educated on seated marching with assist, quad sets, glut sets    Pain Ratin-7/10 with mobility    Activity Tolerance:   Fair and requires rest breaks    After treatment patient left in no apparent distress:   Bed locked and returned to lowest position, seated EOB with DE ANDA present for tx. COMMUNICATION/COLLABORATION:   The patients plan of care was discussed with: Occupational therapy assistant and Registered nurse. Partial cotx for tolerance and safety with OOB.      Radha Bray, PT   Time Calculation: 24 mins         Problem: Mobility Impaired (Adult and Pediatric)  Goal: *Acute Goals and Plan of Care (Insert Text)  Description: FUNCTIONAL STATUS PRIOR TO ADMISSION: Patient was independent and active without use of DME.    HOME SUPPORT PRIOR TO ADMISSION: The patient lived with spouse but did not require assist.    Physical Therapy Goals  Initiated 2/13/2023  Patient/family stated goal: to get stronger and walk again  1. Patient will move from supine to sit and sit to supine  in bed with supervision/set-up within 7 day(s). 2.  Patient will transfer from bed to chair and chair to bed with supervision/set-up using the least restrictive device within 7 day(s). 3.  Patient will perform sit to stand with supervision/set-up within 7 day(s). 4.  Patient will ambulate with minimal assistance/contact guard assist for 75 feet with the least restrictive device within 7 day(s). 5.  Patient will ascend/descend 3 stairs with B handrail(s) with minimal assistance/contact guard assist within 7 day(s). 6.  Patient will participate in lower extremity therapeutic exercise/activities with minimal assistance/contact guard assist for 10 minutes within 7 day(s).          Outcome: Progressing Towards Goal

## 2023-02-15 NOTE — DISCHARGE SUMMARY
Hospitalist Discharge Summary     Patient ID:    America Polo  [id number]  74 y.o.  1955    Admit date: 2/11/2023    Discharge date : 2/15/2023      Final Diagnoses: Active Problems:    Femur fracture ([facility] [address] (2/12/2023)      COPD (chronic obstructive pulmonary disease) ([facility] [address] (2/15/2023)        Reason for Hospitalization/Hospital Course:   America Polo is a 74 y.o. male with PMH of COPD and stroke with residue left leg dis-coordination. He presented to the ED with chief complaint of left hip pain after a mechanical fall. Denies dizziness, weakness or loss of consciousness prior or after the fall. Pain is severe intensity, worsened with movement. In the ED, initially hypotensive, improved with IV fluid resuscitation. Afebrile, no leukocytosis. Hip x-ray showed communicated displaced intertrochanteric fracture. Admitted for further management. Orthopedic surgery consulted. 2/12 ORIF performed. Noted hemoglobin decline postoperatively. We will continue to monitor. PT OT recommending IRF  CT Pel w/o contrast: 2/12: IMPRESSION:  1. Left femoral intertrochanteric comminuted fracture with varus angulation and displacement. No underlying bone lesion. 2. Sacroiliac joint ankylosis. 3. Right inguinal hernia. 2/13: HgB improved this am to 11.0. Plan for IRF when insurance authorization approved. Mr. Odalys Jacobs will need to follow-up with Dr. Ania Petersen at [place] [address] at [phone]. 2/15: Patient cleared by orthopedic surgery for discharge to IRF with follow up in 2-3 weeks with Dr. Ania Petersen at [place] [address] at [phone]. Patient is medically cleared for discharge. He is on chronic oxygen at 3 liters at home. Hx of COPD not in exacerbation.        Discharge Medications:   Current Discharge Medication List        START taking these medications    Details   albuterol-ipratropium (DUO-NEB) 2.5 mg-0.5 mg/3 ml nebu 3 mL by Nebulization route every six (6) hours as needed for Wheezing for up to 30 days. Qty: 120 Each, Refills: 0  Start date: 2/15/2023, End date: 3/17/2023      aspirin delayed-release 325 mg tablet Take 1 Tablet by mouth two (2) times a day for 30 days. Qty: 60 Tablet, Refills: 0  Start date: 2/15/2023, End date: 3/17/2023      bisacodyL (DULCOLAX) 10 mg supp Insert 10 mg into rectum daily as needed for Constipation (For unrelieved constipation.) for up to 30 days. Qty: 30 Suppository, Refills: 0  Start date: 2/15/2023, End date: 3/17/2023      ferrous sulfate 325 mg (65 mg iron) tablet Take 1 Tablet by mouth two (2) times daily (with meals) for 30 days. Qty: 60 Tablet, Refills: 0  Start date: 2/15/2023, End date: 3/17/2023      senna-docusate (PERICOLACE) 8.6-50 mg per tablet Take 1 Tablet by mouth two (2) times a day for 30 days. Qty: 60 Tablet, Refills: 0  Start date: 2/15/2023, End date: 3/17/2023      traMADoL (ULTRAM) 50 mg tablet Take 1 Tablet by mouth every eight (8) hours as needed for Pain (moderate) for up to 3 days. Max Daily Amount: 150 mg.  Qty: 9 Tablet, Refills: 0  Start date: 2/15/2023, End date: 2/18/2023    Associated Diagnoses: Closed fracture of left hip, initial encounter ([facility])      oxyCODONE IR (ROXICODONE) 5 mg immediate release tablet Take 1 Tablet by mouth every six (6) hours as needed for Pain (severe) for up to 3 days. Max Daily Amount: 20 mg.  Qty: 12 Tablet, Refills: 0  Start date: 2/15/2023, End date: 2/18/2023    Associated Diagnoses: Closed fracture of left hip, initial encounter ([facility] [address]           CONTINUE these medications which have NOT CHANGED    Details   budesonide-glycopyr-formoterol (Breztri Aerosphere) 160-9-4.8 mcg/actuation HFAA Take 2 Puffs by inhalation two (2) times a day. Follow up Care:    1. Don Youngblood MD in 1-2 weeks.       Follow-up Information       Follow up With Specialties Details Why Contact Info    Don Youngblood MD Family Medicine Follow up in 1 week(s)  [facility]      Alvino Beckford MD Orthopedic Surgery Follow up in 2 day(s) Hospital follow up s/p ORIF on 2/12 [facility]  [phone]                Patient Follow Up Instructions: Activity: Activity as tolerated  Diet:  Regular Diet  Wound Care: change left hip dressing daily. Condition at Discharge:  Stable  __________________________________________________________________    Disposition  Rehab Facility  ____________________________________________________________________    Code Status:  Full Code  ___________________________________________________________________    Discharge Exam:  Patient seen and examined by me on discharge day. Pertinent Findings:  Gen:    Not in distress  Chest: Clear lungs  CVS:   Regular rhythm. No edema  Abd:  Soft, not distended, not tender  Neuro:  Alert & Oriented x 4         CONSULTATIONS: Orthopedic Surgery    Significant Diagnostic Studies:   Recent Results (from the past 24 hour(s))   GLUCOSE, POC    Collection Time: 02/14/23 12:19 PM   Result Value Ref Range    Glucose (POC) 103 (H) 65 - 100 mg/dL    Performed by Carlos Minor    CBC WITH AUTOMATED DIFF    Collection Time: 02/15/23  9:00 AM   Result Value Ref Range    WBC 6.0 4.1 - 11.1 K/uL    RBC 3.09 (L) 4.10 - 5.70 M/uL    HGB 9.9 (L) 12.1 - 17.0 g/dL    HCT 30.6 (L) 36.6 - 50.3 %    MCV 99.0 80.0 - 99.0 FL    MCH 32.0 26.0 - 34.0 PG    MCHC 32.4 30.0 - 36.5 g/dL    RDW 15.2 (H) 11.5 - 14.5 %    PLATELET 621 112 - 872 K/uL    MPV 10.3 8.9 - 12.9 FL    NRBC 0.0 0.0  WBC    ABSOLUTE NRBC 0.00 0.00 - 0.01 K/uL    NEUTROPHILS 76 (H) 32 - 75 %    LYMPHOCYTES 9 (L) 12 - 49 %    MONOCYTES 10 5 - 13 %    EOSINOPHILS 3 0 - 7 %    BASOPHILS 1 0 - 1 %    IMMATURE GRANULOCYTES 1 (H) 0 - 0.5 %    ABS. NEUTROPHILS 4.6 1.8 - 8.0 K/UL    ABS. LYMPHOCYTES 0.6 (L) 0.8 - 3.5 K/UL    ABS.  MONOCYTES 0.6 0.0 - 1.0 K/UL    ABS. EOSINOPHILS 0.2 0.0 - 0.4 K/UL    ABS. BASOPHILS 0.0 0.0 - 0.1 K/UL    ABS. IMM. GRANS. 0.0 0.00 - 0.04 K/UL    DF AUTOMATED     METABOLIC PANEL, BASIC    Collection Time: 02/15/23  9:00 AM   Result Value Ref Range    Sodium 136 136 - 145 mmol/L    Potassium 4.1 3.5 - 5.1 mmol/L    Chloride 100 97 - 108 mmol/L    CO2 33 (H) 21 - 32 mmol/L    Anion gap 3 (L) 5 - 15 mmol/L    Glucose 154 (H) 65 - 100 mg/dL    BUN 13 6 - 20 mg/dL    Creatinine 0.84 0.70 - 1.30 mg/dL    BUN/Creatinine ratio 15 12 - 20      eGFR >60 >60 ml/min/1.73m2    Calcium 9.0 8.5 - 10.1 mg/dL     XR FEMUR LT 2 V   Final Result   Good alignment. XR FLUOROSCOPY UNDER 60 MINUTES   Final Result      CT PELV WO CONT   Final Result   1. Left femoral intertrochanteric comminuted fracture with varus angulation and   displacement. No underlying bone lesion. 2. Sacroiliac joint ankylosis. 3. Right inguinal hernia. XR CHEST PORT   Final Result   Bilateral increased interstitial markings. No focal airspace   process. XR HIP LT W OR WO PELV 2-3 VWS   Final Result         Comminuted and displaced intertrochanteric fracture. .          Time spent in direct and indirect care including coordination of services: Greater than 35 minutes    Signed:  Pillo Cummings NP  2/15/2023  12:09 PM

## 2023-02-16 ENCOUNTER — HOSPITAL ENCOUNTER (OUTPATIENT)
Dept: LAB | Age: 68
Discharge: HOME | End: 2023-02-16
Payer: COMMERCIAL

## 2023-02-16 ENCOUNTER — TRANSCRIBE ORDER (OUTPATIENT)
Dept: SCHEDULING | Age: 68
End: 2023-02-16

## 2023-02-16 ENCOUNTER — HOSPITAL ENCOUNTER (OUTPATIENT)
Dept: CT IMAGING | Age: 68
Discharge: HOME | End: 2023-02-16
Attending: PHYSICAL MEDICINE & REHABILITATION
Payer: COMMERCIAL

## 2023-02-16 DIAGNOSIS — J96.01 ACUTE RESPIRATORY FAILURE WITH HYPOXIA (HCC): Primary | ICD-10-CM

## 2023-02-16 DIAGNOSIS — M84.352A STRESS FRACTURE, LEFT FEMUR, INITIAL ENCOUNTER FOR FRACTURE: ICD-10-CM

## 2023-02-16 DIAGNOSIS — J96.01 ACUTE RESPIRATORY FAILURE WITH HYPOXIA (HCC): ICD-10-CM

## 2023-02-16 DIAGNOSIS — E87.1 HYPONATREMIA: Primary | ICD-10-CM

## 2023-02-16 LAB
ARTERIAL PATENCY WRIST A: YES
BASE EXCESS BLDA CALC-SCNC: 6.3 MMOL/L (ref 0–3)
BDY SITE: ABNORMAL
BODY TEMPERATURE: 98.6
CREAT BLD-MCNC: 0.8 MG/DL (ref 0.6–1.3)
FIO2 ON VENT: 50 %
GAS FLOW.O2 O2 DELIVERY SYS: 15 L/MIN
HCO3 BLDA-SCNC: 31 MMOL/L (ref 22–26)
HCT VFR BLD AUTO: 30.9 % (ref 36.6–50.3)
HGB BLD-MCNC: 10.5 G/DL (ref 12.1–17)
PCO2 BLDA: 44 MMHG (ref 35–45)
PERFORMED BY, TECHID: ABNORMAL
PH BLDA: 7.46 (ref 7.35–7.45)
PO2 BLDA: 89 MMHG (ref 80–100)
SAO2% DEVICE SAO2% SENSOR NAME: ABNORMAL
SPECIMEN SITE: ABNORMAL

## 2023-02-16 PROCEDURE — 85018 HEMOGLOBIN: CPT

## 2023-02-16 PROCEDURE — 82803 BLOOD GASES ANY COMBINATION: CPT

## 2023-02-16 PROCEDURE — 74011000636 HC RX REV CODE- 636: Performed by: PHYSICAL MEDICINE & REHABILITATION

## 2023-02-16 PROCEDURE — 36600 WITHDRAWAL OF ARTERIAL BLOOD: CPT

## 2023-02-16 PROCEDURE — 71275 CT ANGIOGRAPHY CHEST: CPT

## 2023-02-16 PROCEDURE — 82565 ASSAY OF CREATININE: CPT

## 2023-02-16 RX ADMIN — IOPAMIDOL 100 ML: 755 INJECTION, SOLUTION INTRAVENOUS at 15:09

## 2023-02-17 ENCOUNTER — HOSPITAL ENCOUNTER (OUTPATIENT)
Dept: LAB | Age: 68
Discharge: HOME | End: 2023-02-17
Payer: COMMERCIAL

## 2023-02-17 LAB
ANION GAP SERPL CALC-SCNC: 7 MMOL/L (ref 5–15)
BASOPHILS # BLD: 0 K/UL (ref 0–0.1)
BASOPHILS NFR BLD: 0 % (ref 0–1)
BUN SERPL-MCNC: 23 MG/DL (ref 6–20)
BUN/CREAT SERPL: 28 (ref 12–20)
CA-I BLD-MCNC: 9 MG/DL (ref 8.5–10.1)
CHLORIDE SERPL-SCNC: 97 MMOL/L (ref 97–108)
CO2 SERPL-SCNC: 30 MMOL/L (ref 21–32)
CREAT SERPL-MCNC: 0.81 MG/DL (ref 0.7–1.3)
DIFFERENTIAL METHOD BLD: ABNORMAL
EOSINOPHIL # BLD: 0 K/UL (ref 0–0.4)
EOSINOPHIL NFR BLD: 0 % (ref 0–7)
ERYTHROCYTE [DISTWIDTH] IN BLOOD BY AUTOMATED COUNT: 15.5 % (ref 11.5–14.5)
GLUCOSE SERPL-MCNC: 142 MG/DL (ref 65–100)
HCT VFR BLD AUTO: 32.1 % (ref 36.6–50.3)
HGB BLD-MCNC: 10.6 G/DL (ref 12.1–17)
IMM GRANULOCYTES # BLD AUTO: 0.1 K/UL (ref 0–0.04)
IMM GRANULOCYTES NFR BLD AUTO: 1 % (ref 0–0.5)
LYMPHOCYTES # BLD: 0.3 K/UL (ref 0.8–3.5)
LYMPHOCYTES NFR BLD: 4 % (ref 12–49)
MCH RBC QN AUTO: 32.9 PG (ref 26–34)
MCHC RBC AUTO-ENTMCNC: 33 G/DL (ref 30–36.5)
MCV RBC AUTO: 99.7 FL (ref 80–99)
MONOCYTES # BLD: 0.4 K/UL (ref 0–1)
MONOCYTES NFR BLD: 4 % (ref 5–13)
NEUTS SEG # BLD: 7.5 K/UL (ref 1.8–8)
NEUTS SEG NFR BLD: 91 % (ref 32–75)
NRBC # BLD: 0 K/UL (ref 0–0.01)
NRBC BLD-RTO: 0 PER 100 WBC
PLATELET # BLD AUTO: 232 K/UL (ref 150–400)
PMV BLD AUTO: 10.1 FL (ref 8.9–12.9)
POTASSIUM SERPL-SCNC: 4.9 MMOL/L (ref 3.5–5.1)
RBC # BLD AUTO: 3.22 M/UL (ref 4.1–5.7)
SODIUM SERPL-SCNC: 134 MMOL/L (ref 136–145)
WBC # BLD AUTO: 8.2 K/UL (ref 4.1–11.1)

## 2023-02-17 PROCEDURE — 85025 COMPLETE CBC W/AUTO DIFF WBC: CPT

## 2023-02-17 PROCEDURE — 80048 BASIC METABOLIC PNL TOTAL CA: CPT

## 2023-02-17 NOTE — PROGRESS NOTES
Physician Progress Note      PATIENT:               Toby Tran  CSN #:                  [id number]  :                       1955  ADMIT DATE:       2023 9:50 PM  [address] [place] DATE:        2/15/2023 4:13 PM  RESPONDING  PROVIDER #:        Paula Foster NP          QUERY TEXT:    Pt admitted with femur Fx. Pt noted to have drop in HGB after surgery. If possible, please document in the progress notes and discharge summary if you are evaluating and/or treating any of the following: The medical record reflects the following:  Risk Factors: Femur fx, COPD, Hx of CVA, hyponatremia  Clinical Indicators:  PN: \" ORIF performed. Noted hemoglobin decline postoperatively. \" \"Noted hemoglobin decline from 15.7 to 10. 9. \"  Treatment: Ortho consulted, ORIF completed, Lab monitoring. Thank you,  Sania Gill, ROSSANAN, RN, [place], [facility] Specialist  [phone] or [email or web address]  Can also be reached on [employer]  Options provided:  -- Postoperative acute blood loss anemia  -- Acute blood loss anemia  -- Chronic blood loss anemia  -- Acute on chronic blood loss anemia  -- Iron deficiency anemia  -- Anemia due to antineoplastic chemotherapy  -- Dilutional anemia  -- Precipitous drop in Hemoglobin and Hematocrit  -- Other - I will add my own diagnosis  -- Disagree - Not applicable / Not valid  -- Disagree - Clinically unable to determine / Unknown  -- Refer to Clinical Documentation Reviewer    PROVIDER RESPONSE TEXT:    This patient has postoperative acute blood loss anemia.     Query created by: Zakia Power on 2023 9:24 AM      Electronically signed by:  Paula Foster NP 2023 10:24 AM

## 2023-02-18 ENCOUNTER — HOSPITAL ENCOUNTER (OUTPATIENT)
Dept: LAB | Age: 68
Discharge: HOME | End: 2023-02-18

## 2023-02-18 LAB
ANION GAP SERPL CALC-SCNC: 5 MMOL/L (ref 5–15)
BASOPHILS # BLD: 0 K/UL (ref 0–0.1)
BASOPHILS NFR BLD: 0 % (ref 0–1)
BUN SERPL-MCNC: 30 MG/DL (ref 6–20)
BUN/CREAT SERPL: 33 (ref 12–20)
CA-I BLD-MCNC: 8.9 MG/DL (ref 8.5–10.1)
CHLORIDE SERPL-SCNC: 98 MMOL/L (ref 97–108)
CO2 SERPL-SCNC: 32 MMOL/L (ref 21–32)
CREAT SERPL-MCNC: 0.92 MG/DL (ref 0.7–1.3)
DIFFERENTIAL METHOD BLD: ABNORMAL
EOSINOPHIL # BLD: 0 K/UL (ref 0–0.4)
EOSINOPHIL NFR BLD: 0 % (ref 0–7)
ERYTHROCYTE [DISTWIDTH] IN BLOOD BY AUTOMATED COUNT: 15.7 % (ref 11.5–14.5)
GLUCOSE SERPL-MCNC: 142 MG/DL (ref 65–100)
HCT VFR BLD AUTO: 31.5 % (ref 36.6–50.3)
HGB BLD-MCNC: 10.4 G/DL (ref 12.1–17)
IMM GRANULOCYTES # BLD AUTO: 0 K/UL
IMM GRANULOCYTES NFR BLD AUTO: 0 %
LYMPHOCYTES # BLD: 0.4 K/UL (ref 0.8–3.5)
LYMPHOCYTES NFR BLD: 5 % (ref 12–49)
MCH RBC QN AUTO: 32.9 PG (ref 26–34)
MCHC RBC AUTO-ENTMCNC: 33 G/DL (ref 30–36.5)
MCV RBC AUTO: 99.7 FL (ref 80–99)
MONOCYTES # BLD: 0.4 K/UL (ref 0–1)
MONOCYTES NFR BLD: 5 % (ref 5–13)
NEUTS SEG # BLD: 7.8 K/UL (ref 1.8–8)
NEUTS SEG NFR BLD: 90 % (ref 32–75)
NRBC # BLD: 0 K/UL (ref 0–0.01)
NRBC BLD-RTO: 0 PER 100 WBC
PLATELET # BLD AUTO: 272 K/UL (ref 150–400)
PMV BLD AUTO: 10 FL (ref 8.9–12.9)
POTASSIUM SERPL-SCNC: 4.6 MMOL/L (ref 3.5–5.1)
RBC # BLD AUTO: 3.16 M/UL (ref 4.1–5.7)
RBC MORPH BLD: ABNORMAL
SODIUM SERPL-SCNC: 135 MMOL/L (ref 136–145)
WBC # BLD AUTO: 8.6 K/UL (ref 4.1–11.1)

## 2023-02-18 PROCEDURE — 80048 BASIC METABOLIC PNL TOTAL CA: CPT

## 2023-02-18 PROCEDURE — 85025 COMPLETE CBC W/AUTO DIFF WBC: CPT

## 2023-02-24 ENCOUNTER — HOSPITAL ENCOUNTER (OUTPATIENT)
Dept: LAB | Age: 68
Discharge: HOME | End: 2023-02-24

## 2023-02-24 LAB
ANION GAP SERPL CALC-SCNC: 5 MMOL/L (ref 5–15)
BUN SERPL-MCNC: 29 MG/DL (ref 6–20)
BUN/CREAT SERPL: 37 (ref 12–20)
CA-I BLD-MCNC: 9 MG/DL (ref 8.5–10.1)
CHLORIDE SERPL-SCNC: 96 MMOL/L (ref 97–108)
CO2 SERPL-SCNC: 33 MMOL/L (ref 21–32)
CREAT SERPL-MCNC: 0.78 MG/DL (ref 0.7–1.3)
GLUCOSE SERPL-MCNC: 126 MG/DL (ref 65–100)
POTASSIUM SERPL-SCNC: 4.4 MMOL/L (ref 3.5–5.1)
SODIUM SERPL-SCNC: 134 MMOL/L (ref 136–145)

## 2023-02-24 PROCEDURE — 80048 BASIC METABOLIC PNL TOTAL CA: CPT

## 2023-02-28 ENCOUNTER — HOSPITAL ENCOUNTER (OUTPATIENT)
Dept: LAB | Age: 68
Discharge: HOME | End: 2023-02-28

## 2023-02-28 LAB
ANION GAP SERPL CALC-SCNC: 7 MMOL/L (ref 5–15)
APPEARANCE UR: CLEAR
BACTERIA URNS QL MICRO: NEGATIVE /HPF
BASOPHILS # BLD: 0 K/UL (ref 0–0.1)
BASOPHILS NFR BLD: 0 % (ref 0–1)
BILIRUB UR QL: NEGATIVE
BUN SERPL-MCNC: 36 MG/DL (ref 6–20)
BUN/CREAT SERPL: 32 (ref 12–20)
CA-I BLD-MCNC: 8.7 MG/DL (ref 8.5–10.1)
CHLORIDE SERPL-SCNC: 90 MMOL/L (ref 97–108)
CO2 SERPL-SCNC: 30 MMOL/L (ref 21–32)
COLOR UR: ABNORMAL
CREAT SERPL-MCNC: 1.11 MG/DL (ref 0.7–1.3)
DIFFERENTIAL METHOD BLD: ABNORMAL
EOSINOPHIL # BLD: 0 K/UL (ref 0–0.4)
EOSINOPHIL NFR BLD: 0 % (ref 0–7)
EPITH CASTS URNS QL MICRO: ABNORMAL /LPF
ERYTHROCYTE [DISTWIDTH] IN BLOOD BY AUTOMATED COUNT: 15.2 % (ref 11.5–14.5)
GLUCOSE SERPL-MCNC: 106 MG/DL (ref 65–100)
GLUCOSE UR STRIP.AUTO-MCNC: NEGATIVE MG/DL
HCT VFR BLD AUTO: 35.8 % (ref 36.6–50.3)
HGB BLD-MCNC: 11.8 G/DL (ref 12.1–17)
HGB UR QL STRIP: ABNORMAL
IMM GRANULOCYTES # BLD AUTO: 0 K/UL
IMM GRANULOCYTES NFR BLD AUTO: 0 %
KETONES UR QL STRIP.AUTO: NEGATIVE MG/DL
LEUKOCYTE ESTERASE UR QL STRIP.AUTO: NEGATIVE
LYMPHOCYTES # BLD: 1.1 K/UL (ref 0.8–3.5)
LYMPHOCYTES NFR BLD: 3 % (ref 12–49)
MCH RBC QN AUTO: 33.1 PG (ref 26–34)
MCHC RBC AUTO-ENTMCNC: 33 G/DL (ref 30–36.5)
MCV RBC AUTO: 100.3 FL (ref 80–99)
MONOCYTES # BLD: 3.8 K/UL (ref 0–1)
MONOCYTES NFR BLD: 10 % (ref 5–13)
MUCOUS THREADS URNS QL MICRO: ABNORMAL /LPF
MUCOUS THREADS URNS QL MICRO: ABNORMAL /LPF
NEUTS SEG # BLD: 32.9 K/UL (ref 1.8–8)
NEUTS SEG NFR BLD: 87 % (ref 32–75)
NITRITE UR QL STRIP.AUTO: NEGATIVE
NRBC # BLD: 0 K/UL (ref 0–0.01)
NRBC BLD-RTO: 0 PER 100 WBC
PH UR STRIP: 5 (ref 5–8)
PLATELET # BLD AUTO: 326 K/UL (ref 150–400)
PMV BLD AUTO: 10 FL (ref 8.9–12.9)
POTASSIUM SERPL-SCNC: 4.6 MMOL/L (ref 3.5–5.1)
PROT UR STRIP-MCNC: 30 MG/DL
RBC # BLD AUTO: 3.57 M/UL (ref 4.1–5.7)
RBC #/AREA URNS HPF: ABNORMAL /HPF (ref 0–5)
RBC #/AREA URNS HPF: ABNORMAL /HPF (ref 0–5)
RBC MORPH BLD: ABNORMAL
SODIUM SERPL-SCNC: 127 MMOL/L (ref 136–145)
SP GR UR REFRACTOMETRY: 1.02 (ref 1–1.03)
UROBILINOGEN UR QL STRIP.AUTO: 2 EU/DL (ref 0.1–1)
WBC # BLD AUTO: 37.8 K/UL (ref 4.1–11.1)
WBC URNS QL MICRO: ABNORMAL /HPF (ref 0–4)
WBC URNS QL MICRO: ABNORMAL /HPF (ref 0–4)

## 2023-02-28 PROCEDURE — 80048 BASIC METABOLIC PNL TOTAL CA: CPT

## 2023-02-28 PROCEDURE — 87040 BLOOD CULTURE FOR BACTERIA: CPT

## 2023-02-28 PROCEDURE — 87150 DNA/RNA AMPLIFIED PROBE: CPT

## 2023-02-28 PROCEDURE — 85025 COMPLETE CBC W/AUTO DIFF WBC: CPT

## 2023-02-28 PROCEDURE — 81001 URINALYSIS AUTO W/SCOPE: CPT

## 2023-02-28 PROCEDURE — 87086 URINE CULTURE/COLONY COUNT: CPT

## 2023-03-01 ENCOUNTER — HOSPITAL ENCOUNTER (OUTPATIENT)
Dept: LAB | Age: 68
Discharge: HOME | End: 2023-03-01

## 2023-03-01 LAB
ACC. NO. FROM MICRO ORDER, ACCP: ABNORMAL
ACINETOBACTER CALCOACETICUS-BAUMANII COMPLEX, ACBCX: NOT DETECTED
ANION GAP SERPL CALC-SCNC: 5 MMOL/L (ref 5–15)
BACTERIA SPEC CULT: NORMAL
BACTEROIDES FRAGILIS, BFRA: NOT DETECTED
BASOPHILS # BLD: 0 K/UL (ref 0–0.1)
BASOPHILS NFR BLD: 0 % (ref 0–1)
BIOFIRE COMMENT, BCIDPF: ABNORMAL
BUN SERPL-MCNC: 26 MG/DL (ref 6–20)
BUN/CREAT SERPL: 29 (ref 12–20)
C GLABRATA DNA VAG QL NAA+PROBE: NOT DETECTED
CA-I BLD-MCNC: 9.7 MG/DL (ref 8.5–10.1)
CANDIDA ALBICANS: NOT DETECTED
CANDIDA AURIS, CAAU: NOT DETECTED
CANDIDA KRUSEI, CKRP: NOT DETECTED
CANDIDA PARAPSILOSIS, CPAUP: NOT DETECTED
CANDIDA TROPICALIS, CTROP: NOT DETECTED
CHLORIDE SERPL-SCNC: 93 MMOL/L (ref 97–108)
CO2 SERPL-SCNC: 31 MMOL/L (ref 21–32)
CREAT SERPL-MCNC: 0.89 MG/DL (ref 0.7–1.3)
CRYPTO NEOFORMANS/GATTII, CRYNEG: NOT DETECTED
DIFFERENTIAL METHOD BLD: ABNORMAL
ENTEROBACTER CLOACAE COMPLEX, ECCP: NOT DETECTED
ENTEROBACTERALES SP. , ENBLS: NOT DETECTED
ENTEROCOCCUS FAECALIS, ENFA: NOT DETECTED
ENTEROCOCCUS FAECIUM, ENFAM: NOT DETECTED
EOSINOPHIL # BLD: 0 K/UL (ref 0–0.4)
EOSINOPHIL NFR BLD: 0 % (ref 0–7)
ERYTHROCYTE [DISTWIDTH] IN BLOOD BY AUTOMATED COUNT: 14.9 % (ref 11.5–14.5)
ESCHERICHIA COLI: NOT DETECTED
GLUCOSE SERPL-MCNC: 105 MG/DL (ref 65–100)
HAEMOPHILUS INFLUENZAE, HMI: NOT DETECTED
HCT VFR BLD AUTO: 36.5 % (ref 36.6–50.3)
HGB BLD-MCNC: 12.2 G/DL (ref 12.1–17)
IMM GRANULOCYTES # BLD AUTO: 0 K/UL
IMM GRANULOCYTES NFR BLD AUTO: 0 %
KLEBSIELLA AEROGENES, KLAE: NOT DETECTED
KLEBSIELLA OXYTOCA: NOT DETECTED
KLEBSIELLA PNEUMONIAE GROUP, KPPG: NOT DETECTED
LISTERIA MONOCYTOGENES, LMONP: NOT DETECTED
LYMPHOCYTES # BLD: 1.2 K/UL (ref 0.8–3.5)
LYMPHOCYTES NFR BLD: 4 % (ref 12–49)
MCH RBC QN AUTO: 33 PG (ref 26–34)
MCHC RBC AUTO-ENTMCNC: 33.4 G/DL (ref 30–36.5)
MCV RBC AUTO: 98.6 FL (ref 80–99)
MONOCYTES # BLD: 0.6 K/UL (ref 0–1)
MONOCYTES NFR BLD: 2 % (ref 5–13)
NEISSERIA MENINGITIDIS, NMNI: NOT DETECTED
NEUTS BAND NFR BLD MANUAL: 1 % (ref 0–6)
NEUTS SEG # BLD: 27.8 K/UL (ref 1.8–8)
NEUTS SEG NFR BLD: 93 % (ref 32–75)
NRBC # BLD: 0 K/UL (ref 0–0.01)
NRBC BLD-RTO: 0 PER 100 WBC
PLATELET # BLD AUTO: 320 K/UL (ref 150–400)
PMV BLD AUTO: 9.7 FL (ref 8.9–12.9)
POTASSIUM SERPL-SCNC: 5 MMOL/L (ref 3.5–5.1)
PROTEUS, PRP: NOT DETECTED
PSEUDOMONAS AERUGINOSA: NOT DETECTED
RBC # BLD AUTO: 3.7 M/UL (ref 4.1–5.7)
RBC MORPH BLD: ABNORMAL
RESISTANT GENE SPACE, REGENE: ABNORMAL
SALMONELLA, SALMO: NOT DETECTED
SERRATIA MARCESCENS: NOT DETECTED
SODIUM SERPL-SCNC: 129 MMOL/L (ref 136–145)
SPECIAL REQUESTS,SREQ: NORMAL
STAPH EPIDERMIDIS, STEP: NOT DETECTED
STAPH LUGDUNENSIS, STALUG: NOT DETECTED
STAPHYLOCOCCUS AUREUS: NOT DETECTED
STAPHYLOCOCCUS, STAPP: DETECTED
STENO MALTOPHILIA, STMA: NOT DETECTED
STREPTOCOCCUS , STPSP: NOT DETECTED
STREPTOCOCCUS AGALACTIAE (GROUP B): NOT DETECTED
STREPTOCOCCUS PNEUMONIAE , SPNP: NOT DETECTED
STREPTOCOCCUS PYOGENES (GROUP A), SPYOP: NOT DETECTED
WBC # BLD AUTO: 29.6 K/UL (ref 4.1–11.1)

## 2023-03-01 PROCEDURE — 80048 BASIC METABOLIC PNL TOTAL CA: CPT

## 2023-03-01 PROCEDURE — 85025 COMPLETE CBC W/AUTO DIFF WBC: CPT

## 2023-03-02 ENCOUNTER — HOSPITAL ENCOUNTER (OUTPATIENT)
Dept: CT IMAGING | Age: 68
Discharge: HOME | End: 2023-03-02
Attending: PHYSICAL MEDICINE & REHABILITATION

## 2023-03-02 ENCOUNTER — TRANSCRIBE ORDER (OUTPATIENT)
Dept: SCHEDULING | Age: 68
End: 2023-03-02

## 2023-03-02 ENCOUNTER — HOSPITAL ENCOUNTER (OUTPATIENT)
Dept: LAB | Age: 68
Discharge: HOME | End: 2023-03-02

## 2023-03-02 DIAGNOSIS — M25.551 RIGHT HIP PAIN: ICD-10-CM

## 2023-03-02 DIAGNOSIS — M25.551 RIGHT HIP PAIN: Primary | ICD-10-CM

## 2023-03-02 DIAGNOSIS — M25.552 LEFT HIP PAIN: ICD-10-CM

## 2023-03-02 LAB — VANCOMYCIN TROUGH SERPL-MCNC: 15.5 UG/ML (ref 5–10)

## 2023-03-02 PROCEDURE — 73701 CT LOWER EXTREMITY W/DYE: CPT

## 2023-03-02 PROCEDURE — 80202 ASSAY OF VANCOMYCIN: CPT

## 2023-03-02 PROCEDURE — 74011000636 HC RX REV CODE- 636: Performed by: PHYSICAL MEDICINE & REHABILITATION

## 2023-03-02 RX ADMIN — IOPAMIDOL 90 ML: 755 INJECTION, SOLUTION INTRAVENOUS at 14:54

## 2023-03-03 ENCOUNTER — HOSPITAL ENCOUNTER (OUTPATIENT)
Dept: LAB | Age: 68
Discharge: HOME | End: 2023-03-03

## 2023-03-03 ENCOUNTER — HOSPITAL ENCOUNTER (OUTPATIENT)
Age: 68
End: 2023-03-03
Payer: COMMERCIAL

## 2023-03-03 LAB
ANION GAP SERPL CALC-SCNC: 3 MMOL/L (ref 5–15)
BACTERIA SPEC CULT: ABNORMAL
BASOPHILS # BLD: 0 K/UL (ref 0–0.1)
BASOPHILS NFR BLD: 0 % (ref 0–1)
BUN SERPL-MCNC: 23 MG/DL (ref 6–20)
BUN/CREAT SERPL: 27 (ref 12–20)
CA-I BLD-MCNC: 8.7 MG/DL (ref 8.5–10.1)
CHLORIDE SERPL-SCNC: 95 MMOL/L (ref 97–108)
CO2 SERPL-SCNC: 30 MMOL/L (ref 21–32)
CREAT SERPL-MCNC: 0.84 MG/DL (ref 0.7–1.3)
DIFFERENTIAL METHOD BLD: ABNORMAL
EOSINOPHIL # BLD: 0 K/UL (ref 0–0.4)
EOSINOPHIL NFR BLD: 0 % (ref 0–7)
ERYTHROCYTE [DISTWIDTH] IN BLOOD BY AUTOMATED COUNT: 14.9 % (ref 11.5–14.5)
GLUCOSE SERPL-MCNC: 96 MG/DL (ref 65–100)
HCT VFR BLD AUTO: 34.9 % (ref 36.6–50.3)
HGB BLD-MCNC: 11.7 G/DL (ref 12.1–17)
IMM GRANULOCYTES # BLD AUTO: 0.3 K/UL (ref 0–0.04)
IMM GRANULOCYTES NFR BLD AUTO: 1 % (ref 0–0.5)
LYMPHOCYTES # BLD: 0.6 K/UL (ref 0.8–3.5)
LYMPHOCYTES NFR BLD: 2 % (ref 12–49)
MCH RBC QN AUTO: 33.1 PG (ref 26–34)
MCHC RBC AUTO-ENTMCNC: 33.5 G/DL (ref 30–36.5)
MCV RBC AUTO: 98.9 FL (ref 80–99)
MONOCYTES # BLD: 1.4 K/UL (ref 0–1)
MONOCYTES NFR BLD: 5 % (ref 5–13)
NEUTS SEG # BLD: 25.5 K/UL (ref 1.8–8)
NEUTS SEG NFR BLD: 92 % (ref 32–75)
NRBC # BLD: 0 K/UL (ref 0–0.01)
NRBC BLD-RTO: 0 PER 100 WBC
PLATELET # BLD AUTO: 283 K/UL (ref 150–400)
PMV BLD AUTO: 9.7 FL (ref 8.9–12.9)
POTASSIUM SERPL-SCNC: 4.5 MMOL/L (ref 3.5–5.1)
RBC # BLD AUTO: 3.53 M/UL (ref 4.1–5.7)
RBC MORPH BLD: ABNORMAL
SODIUM SERPL-SCNC: 128 MMOL/L (ref 136–145)
SPECIAL REQUESTS,SREQ: ABNORMAL
WBC # BLD AUTO: 27.8 K/UL (ref 4.1–11.1)

## 2023-03-03 PROCEDURE — 87324 CLOSTRIDIUM AG IA: CPT

## 2023-03-03 PROCEDURE — 87040 BLOOD CULTURE FOR BACTERIA: CPT

## 2023-03-03 PROCEDURE — 80048 BASIC METABOLIC PNL TOTAL CA: CPT

## 2023-03-03 PROCEDURE — 85025 COMPLETE CBC W/AUTO DIFF WBC: CPT

## 2023-03-04 LAB
ANION GAP SERPL CALC-SCNC: 7 MMOL/L (ref 5–15)
BASOPHILS # BLD: 0.1 K/UL (ref 0–0.1)
BASOPHILS NFR BLD: 0 % (ref 0–1)
BUN SERPL-MCNC: 23 MG/DL (ref 6–20)
BUN/CREAT SERPL: 31 (ref 12–20)
CA-I BLD-MCNC: 8.7 MG/DL (ref 8.5–10.1)
CHLORIDE SERPL-SCNC: 94 MMOL/L (ref 97–108)
CO2 SERPL-SCNC: 25 MMOL/L (ref 21–32)
CREAT SERPL-MCNC: 0.74 MG/DL (ref 0.7–1.3)
DIFFERENTIAL METHOD BLD: ABNORMAL
EOSINOPHIL # BLD: 0 K/UL (ref 0–0.4)
EOSINOPHIL NFR BLD: 0 % (ref 0–7)
ERYTHROCYTE [DISTWIDTH] IN BLOOD BY AUTOMATED COUNT: 14.9 % (ref 11.5–14.5)
GLUCOSE SERPL-MCNC: 90 MG/DL (ref 65–100)
HCT VFR BLD AUTO: 35 % (ref 36.6–50.3)
HGB BLD-MCNC: 11.9 G/DL (ref 12.1–17)
IMM GRANULOCYTES # BLD AUTO: 0.4 K/UL (ref 0–0.04)
IMM GRANULOCYTES NFR BLD AUTO: 1 % (ref 0–0.5)
LYMPHOCYTES # BLD: 0.4 K/UL (ref 0.8–3.5)
LYMPHOCYTES NFR BLD: 1 % (ref 12–49)
MCH RBC QN AUTO: 33.4 PG (ref 26–34)
MCHC RBC AUTO-ENTMCNC: 34 G/DL (ref 30–36.5)
MCV RBC AUTO: 98.3 FL (ref 80–99)
MONOCYTES # BLD: 1.4 K/UL (ref 0–1)
MONOCYTES NFR BLD: 5 % (ref 5–13)
NEUTS SEG # BLD: 28.3 K/UL (ref 1.8–8)
NEUTS SEG NFR BLD: 93 % (ref 32–75)
NRBC # BLD: 0 K/UL (ref 0–0.01)
NRBC BLD-RTO: 0 PER 100 WBC
PLATELET # BLD AUTO: 240 K/UL (ref 150–400)
PMV BLD AUTO: 10.4 FL (ref 8.9–12.9)
POTASSIUM SERPL-SCNC: 4 MMOL/L (ref 3.5–5.1)
RBC # BLD AUTO: 3.56 M/UL (ref 4.1–5.7)
SODIUM SERPL-SCNC: 126 MMOL/L (ref 136–145)
VANCOMYCIN TROUGH SERPL-MCNC: 8.2 UG/ML (ref 5–10)
WBC # BLD AUTO: 30.6 K/UL (ref 4.1–11.1)

## 2023-03-04 PROCEDURE — 80202 ASSAY OF VANCOMYCIN: CPT

## 2023-03-04 PROCEDURE — 80048 BASIC METABOLIC PNL TOTAL CA: CPT

## 2023-03-04 PROCEDURE — 85025 COMPLETE CBC W/AUTO DIFF WBC: CPT

## 2023-03-05 LAB
C DIFF GDH STL QL: POSITIVE
C DIFF TOX A+B STL QL IA: POSITIVE
INTERPRETATION: ABNORMAL

## 2023-03-06 ENCOUNTER — HOSPITAL ENCOUNTER (OUTPATIENT)
Dept: LAB | Age: 68
Discharge: HOME | End: 2023-03-06

## 2023-03-06 LAB
ANION GAP SERPL CALC-SCNC: 6 MMOL/L (ref 5–15)
BASOPHILS # BLD: 0.1 K/UL (ref 0–0.1)
BASOPHILS NFR BLD: 0 % (ref 0–1)
BUN SERPL-MCNC: 16 MG/DL (ref 6–20)
BUN/CREAT SERPL: 27 (ref 12–20)
CA-I BLD-MCNC: 8.4 MG/DL (ref 8.5–10.1)
CHLORIDE SERPL-SCNC: 98 MMOL/L (ref 97–108)
CO2 SERPL-SCNC: 27 MMOL/L (ref 21–32)
CREAT SERPL-MCNC: 0.6 MG/DL (ref 0.7–1.3)
DIFFERENTIAL METHOD BLD: ABNORMAL
EOSINOPHIL # BLD: 0.1 K/UL (ref 0–0.4)
EOSINOPHIL NFR BLD: 1 % (ref 0–7)
ERYTHROCYTE [DISTWIDTH] IN BLOOD BY AUTOMATED COUNT: 14.6 % (ref 11.5–14.5)
GLUCOSE SERPL-MCNC: 93 MG/DL (ref 65–100)
HCT VFR BLD AUTO: 33.1 % (ref 36.6–50.3)
HGB BLD-MCNC: 11.2 G/DL (ref 12.1–17)
IMM GRANULOCYTES # BLD AUTO: 0.3 K/UL (ref 0–0.04)
IMM GRANULOCYTES NFR BLD AUTO: 2 % (ref 0–0.5)
LYMPHOCYTES # BLD: 0.6 K/UL (ref 0.8–3.5)
LYMPHOCYTES NFR BLD: 4 % (ref 12–49)
MCH RBC QN AUTO: 33.3 PG (ref 26–34)
MCHC RBC AUTO-ENTMCNC: 33.8 G/DL (ref 30–36.5)
MCV RBC AUTO: 98.5 FL (ref 80–99)
MONOCYTES # BLD: 0.7 K/UL (ref 0–1)
MONOCYTES NFR BLD: 4 % (ref 5–13)
NEUTS SEG # BLD: 15.7 K/UL (ref 1.8–8)
NEUTS SEG NFR BLD: 89 % (ref 32–75)
NRBC # BLD: 0 K/UL (ref 0–0.01)
NRBC BLD-RTO: 0 PER 100 WBC
PLATELET # BLD AUTO: 216 K/UL (ref 150–400)
PMV BLD AUTO: 9.8 FL (ref 8.9–12.9)
POTASSIUM SERPL-SCNC: 3.6 MMOL/L (ref 3.5–5.1)
RBC # BLD AUTO: 3.36 M/UL (ref 4.1–5.7)
SODIUM SERPL-SCNC: 131 MMOL/L (ref 136–145)
WBC # BLD AUTO: 17.6 K/UL (ref 4.1–11.1)

## 2023-03-06 PROCEDURE — 80048 BASIC METABOLIC PNL TOTAL CA: CPT

## 2023-03-06 PROCEDURE — 85025 COMPLETE CBC W/AUTO DIFF WBC: CPT

## 2023-03-10 LAB
BACTERIA SPEC CULT: NORMAL
SPECIAL REQUESTS,SREQ: NORMAL

## 2025-06-20 ENCOUNTER — HOSPITAL ENCOUNTER (INPATIENT)
Facility: HOSPITAL | Age: 70
LOS: 5 days | Discharge: HOSPICE | DRG: 190 | End: 2025-06-25
Attending: EMERGENCY MEDICINE | Admitting: FAMILY MEDICINE
Payer: MEDICARE

## 2025-06-20 ENCOUNTER — APPOINTMENT (OUTPATIENT)
Facility: HOSPITAL | Age: 70
DRG: 190 | End: 2025-06-20
Payer: MEDICARE

## 2025-06-20 DIAGNOSIS — R41.0 DELIRIUM: Primary | ICD-10-CM

## 2025-06-20 DIAGNOSIS — J96.22 ACUTE ON CHRONIC RESPIRATORY FAILURE WITH HYPOXIA AND HYPERCAPNIA (HCC): ICD-10-CM

## 2025-06-20 DIAGNOSIS — F10.929 ACUTE ALCOHOLIC INTOXICATION WITH COMPLICATION: ICD-10-CM

## 2025-06-20 DIAGNOSIS — J96.21 ACUTE ON CHRONIC RESPIRATORY FAILURE WITH HYPOXIA AND HYPERCAPNIA (HCC): ICD-10-CM

## 2025-06-20 LAB
ALBUMIN SERPL-MCNC: 3.5 G/DL (ref 3.5–5)
ALBUMIN/GLOB SERPL: 1.3 (ref 1.1–2.2)
ALP SERPL-CCNC: 62 U/L (ref 45–117)
ALT SERPL-CCNC: 30 U/L (ref 12–78)
AMMONIA PLAS-SCNC: <10 UMOL/L
AMPHET UR QL SCN: NEGATIVE
ANION GAP SERPL CALC-SCNC: 6 MMOL/L (ref 2–12)
APPEARANCE UR: CLEAR
AST SERPL W P-5'-P-CCNC: 10 U/L (ref 15–37)
BACTERIA URNS QL MICRO: NEGATIVE /HPF
BARBITURATES UR QL SCN: NEGATIVE
BASE EXCESS BLD CALC-SCNC: 4.9 MMOL/L
BASE EXCESS BLD CALC-SCNC: 6.6 MMOL/L
BASOPHILS # BLD: 0.01 K/UL (ref 0–0.1)
BASOPHILS NFR BLD: 0.1 % (ref 0–1)
BENZODIAZ UR QL: NEGATIVE
BILIRUB SERPL-MCNC: 0.2 MG/DL (ref 0.2–1)
BILIRUB UR QL: NEGATIVE
BUN SERPL-MCNC: 18 MG/DL (ref 6–20)
BUN/CREAT SERPL: 20 (ref 12–20)
CA-I BLD-MCNC: 9.2 MG/DL (ref 8.5–10.1)
CANNABINOIDS UR QL SCN: POSITIVE
CHLORIDE SERPL-SCNC: 102 MMOL/L (ref 97–108)
CO2 SERPL-SCNC: 32 MMOL/L (ref 21–32)
COCAINE UR QL SCN: NEGATIVE
COLOR UR: NORMAL
CREAT SERPL-MCNC: 0.92 MG/DL (ref 0.7–1.3)
DIFFERENTIAL METHOD BLD: ABNORMAL
EOSINOPHIL # BLD: 0 K/UL (ref 0–0.4)
EOSINOPHIL NFR BLD: 0 % (ref 0–7)
EPITH CASTS URNS QL MICRO: NORMAL /LPF
ERYTHROCYTE [DISTWIDTH] IN BLOOD BY AUTOMATED COUNT: 12.6 % (ref 11.5–14.5)
ETHANOL SERPL-MCNC: 186 MG/DL (ref 0–0.08)
GLOBULIN SER CALC-MCNC: 2.8 G/DL (ref 2–4)
GLUCOSE BLD STRIP.AUTO-MCNC: 134 MG/DL (ref 65–100)
GLUCOSE SERPL-MCNC: 144 MG/DL (ref 65–100)
GLUCOSE UR STRIP.AUTO-MCNC: NEGATIVE MG/DL
HCO3 BLD-SCNC: 32.2 MMOL/L (ref 19–28)
HCO3 BLD-SCNC: 34.4 MMOL/L (ref 19–28)
HCT VFR BLD AUTO: 38.2 % (ref 36.6–50.3)
HGB BLD-MCNC: 12.8 G/DL (ref 12.1–17)
HGB UR QL STRIP: NEGATIVE
IMM GRANULOCYTES # BLD AUTO: 0.03 K/UL (ref 0–0.04)
IMM GRANULOCYTES NFR BLD AUTO: 0.4 % (ref 0–0.5)
KETONES UR QL STRIP.AUTO: NEGATIVE MG/DL
LACTATE BLD-SCNC: 2.37 MMOL/L (ref 0.4–2)
LEUKOCYTE ESTERASE UR QL STRIP.AUTO: NEGATIVE
LYMPHOCYTES # BLD: 0.32 K/UL (ref 0.8–3.5)
LYMPHOCYTES NFR BLD: 4.2 % (ref 12–49)
Lab: ABNORMAL
MCH RBC QN AUTO: 33.8 PG (ref 26–34)
MCHC RBC AUTO-ENTMCNC: 33.5 G/DL (ref 30–36.5)
MCV RBC AUTO: 100.8 FL (ref 80–99)
METHADONE UR QL: NEGATIVE
MONOCYTES # BLD: 0.1 K/UL (ref 0–1)
MONOCYTES NFR BLD: 1.3 % (ref 5–13)
NEUTS SEG # BLD: 7.14 K/UL (ref 1.8–8)
NEUTS SEG NFR BLD: 94 % (ref 32–75)
NITRITE UR QL STRIP.AUTO: NEGATIVE
NRBC # BLD: 0 K/UL (ref 0–0.01)
NRBC BLD-RTO: 0 PER 100 WBC
OPIATES UR QL: NEGATIVE
PCO2 BLD: 60.3 MMHG (ref 35–45)
PCO2 BLD: 62.7 MMHG (ref 35–45)
PCP UR QL: NEGATIVE
PERFORMED BY:: ABNORMAL
PH BLD: 7.34 (ref 7.35–7.45)
PH BLD: 7.35 (ref 7.35–7.45)
PH UR STRIP: 6 (ref 5–8)
PLATELET # BLD AUTO: 215 K/UL (ref 150–400)
PMV BLD AUTO: 9.4 FL (ref 8.9–12.9)
PO2 BLD: 68 MMHG (ref 75–100)
PO2 BLD: 86 MMHG (ref 75–100)
POTASSIUM SERPL-SCNC: 4.5 MMOL/L (ref 3.5–5.1)
PROT SERPL-MCNC: 6.3 G/DL (ref 6.4–8.2)
PROT UR STRIP-MCNC: NEGATIVE MG/DL
RBC # BLD AUTO: 3.79 M/UL (ref 4.1–5.7)
RBC #/AREA URNS HPF: NORMAL /HPF (ref 0–5)
RBC MORPH BLD: ABNORMAL
SAO2 % BLD: 91.5 %
SAO2 % BLD: 95.6 %
SERVICE CMNT-IMP: ABNORMAL
SODIUM SERPL-SCNC: 140 MMOL/L (ref 136–145)
SP GR UR REFRACTOMETRY: <1.005 (ref 1–1.03)
SPECIMEN TYPE: ABNORMAL
SPECIMEN TYPE: ABNORMAL
TSH SERPL DL<=0.05 MIU/L-ACNC: 0.61 UIU/ML (ref 0.36–3.74)
UROBILINOGEN UR QL STRIP.AUTO: 0.1 EU/DL (ref 0.1–1)
WBC # BLD AUTO: 7.6 K/UL (ref 4.1–11.1)
WBC URNS QL MICRO: NORMAL /HPF (ref 0–4)

## 2025-06-20 PROCEDURE — 71275 CT ANGIOGRAPHY CHEST: CPT

## 2025-06-20 PROCEDURE — 85025 COMPLETE CBC W/AUTO DIFF WBC: CPT

## 2025-06-20 PROCEDURE — 81003 URINALYSIS AUTO W/O SCOPE: CPT

## 2025-06-20 PROCEDURE — 94640 AIRWAY INHALATION TREATMENT: CPT

## 2025-06-20 PROCEDURE — 6370000000 HC RX 637 (ALT 250 FOR IP): Performed by: EMERGENCY MEDICINE

## 2025-06-20 PROCEDURE — 96375 TX/PRO/DX INJ NEW DRUG ADDON: CPT

## 2025-06-20 PROCEDURE — 96366 THER/PROPH/DIAG IV INF ADDON: CPT

## 2025-06-20 PROCEDURE — 80053 COMPREHEN METABOLIC PANEL: CPT

## 2025-06-20 PROCEDURE — 82140 ASSAY OF AMMONIA: CPT

## 2025-06-20 PROCEDURE — 82803 BLOOD GASES ANY COMBINATION: CPT

## 2025-06-20 PROCEDURE — 71045 X-RAY EXAM CHEST 1 VIEW: CPT

## 2025-06-20 PROCEDURE — 93005 ELECTROCARDIOGRAM TRACING: CPT | Performed by: STUDENT IN AN ORGANIZED HEALTH CARE EDUCATION/TRAINING PROGRAM

## 2025-06-20 PROCEDURE — 36415 COLL VENOUS BLD VENIPUNCTURE: CPT

## 2025-06-20 PROCEDURE — 70450 CT HEAD/BRAIN W/O DYE: CPT

## 2025-06-20 PROCEDURE — 72125 CT NECK SPINE W/O DYE: CPT

## 2025-06-20 PROCEDURE — 99285 EMERGENCY DEPT VISIT HI MDM: CPT

## 2025-06-20 PROCEDURE — 6360000002 HC RX W HCPCS: Performed by: EMERGENCY MEDICINE

## 2025-06-20 PROCEDURE — 2500000003 HC RX 250 WO HCPCS: Performed by: EMERGENCY MEDICINE

## 2025-06-20 PROCEDURE — 6360000004 HC RX CONTRAST MEDICATION: Performed by: EMERGENCY MEDICINE

## 2025-06-20 PROCEDURE — 84443 ASSAY THYROID STIM HORMONE: CPT

## 2025-06-20 PROCEDURE — 1100000000 HC RM PRIVATE

## 2025-06-20 PROCEDURE — 2580000003 HC RX 258: Performed by: EMERGENCY MEDICINE

## 2025-06-20 PROCEDURE — 83605 ASSAY OF LACTIC ACID: CPT

## 2025-06-20 PROCEDURE — 80307 DRUG TEST PRSMV CHEM ANLYZR: CPT

## 2025-06-20 PROCEDURE — 82962 GLUCOSE BLOOD TEST: CPT

## 2025-06-20 PROCEDURE — 82077 ASSAY SPEC XCP UR&BREATH IA: CPT

## 2025-06-20 PROCEDURE — 96365 THER/PROPH/DIAG IV INF INIT: CPT

## 2025-06-20 RX ORDER — PHENOBARBITAL 32.4 MG/1
32.4 TABLET ORAL EVERY 6 HOURS PRN
Status: DISPENSED | OUTPATIENT
Start: 2025-06-20 | End: 2025-06-22

## 2025-06-20 RX ORDER — ONDANSETRON 4 MG/1
4 TABLET, ORALLY DISINTEGRATING ORAL EVERY 8 HOURS PRN
Status: DISCONTINUED | OUTPATIENT
Start: 2025-06-20 | End: 2025-06-25 | Stop reason: HOSPADM

## 2025-06-20 RX ORDER — SODIUM CHLORIDE 9 MG/ML
INJECTION, SOLUTION INTRAVENOUS PRN
Status: DISCONTINUED | OUTPATIENT
Start: 2025-06-20 | End: 2025-06-21 | Stop reason: SDUPTHER

## 2025-06-20 RX ORDER — ENOXAPARIN SODIUM 100 MG/ML
40 INJECTION SUBCUTANEOUS DAILY
Status: DISCONTINUED | OUTPATIENT
Start: 2025-06-21 | End: 2025-06-25 | Stop reason: HOSPADM

## 2025-06-20 RX ORDER — ACETAMINOPHEN 650 MG/1
650 SUPPOSITORY RECTAL EVERY 6 HOURS PRN
Status: DISCONTINUED | OUTPATIENT
Start: 2025-06-20 | End: 2025-06-25 | Stop reason: HOSPADM

## 2025-06-20 RX ORDER — NALOXONE HYDROCHLORIDE 1 MG/ML
1 INJECTION INTRAMUSCULAR; INTRAVENOUS; SUBCUTANEOUS ONCE
Status: COMPLETED | OUTPATIENT
Start: 2025-06-20 | End: 2025-06-20

## 2025-06-20 RX ORDER — SODIUM CHLORIDE 9 MG/ML
INJECTION, SOLUTION INTRAVENOUS CONTINUOUS
Status: DISCONTINUED | OUTPATIENT
Start: 2025-06-20 | End: 2025-06-23

## 2025-06-20 RX ORDER — SODIUM CHLORIDE 0.9 % (FLUSH) 0.9 %
5-40 SYRINGE (ML) INJECTION EVERY 12 HOURS SCHEDULED
Status: DISCONTINUED | OUTPATIENT
Start: 2025-06-20 | End: 2025-06-25 | Stop reason: HOSPADM

## 2025-06-20 RX ORDER — SODIUM CHLORIDE 9 MG/ML
INJECTION, SOLUTION INTRAVENOUS PRN
Status: DISCONTINUED | OUTPATIENT
Start: 2025-06-20 | End: 2025-06-25 | Stop reason: HOSPADM

## 2025-06-20 RX ORDER — SODIUM CHLORIDE 0.9 % (FLUSH) 0.9 %
5-40 SYRINGE (ML) INJECTION PRN
Status: DISCONTINUED | OUTPATIENT
Start: 2025-06-20 | End: 2025-06-25 | Stop reason: HOSPADM

## 2025-06-20 RX ORDER — PHENOBARBITAL 32.4 MG/1
32.4 TABLET ORAL 4 TIMES DAILY
Status: COMPLETED | OUTPATIENT
Start: 2025-06-21 | End: 2025-06-21

## 2025-06-20 RX ORDER — GAUZE BANDAGE 2" X 2"
100 BANDAGE TOPICAL DAILY
Status: DISCONTINUED | OUTPATIENT
Start: 2025-06-21 | End: 2025-06-25 | Stop reason: HOSPADM

## 2025-06-20 RX ORDER — IOPAMIDOL 755 MG/ML
100 INJECTION, SOLUTION INTRAVASCULAR
Status: COMPLETED | OUTPATIENT
Start: 2025-06-20 | End: 2025-06-20

## 2025-06-20 RX ORDER — PHENOBARBITAL 32.4 MG/1
16.2 TABLET ORAL 2 TIMES DAILY
Status: DISCONTINUED | OUTPATIENT
Start: 2025-06-23 | End: 2025-06-23

## 2025-06-20 RX ORDER — BUDESONIDE AND FORMOTEROL FUMARATE DIHYDRATE 160; 4.5 UG/1; UG/1
2 AEROSOL RESPIRATORY (INHALATION)
Status: DISCONTINUED | OUTPATIENT
Start: 2025-06-20 | End: 2025-06-25 | Stop reason: HOSPADM

## 2025-06-20 RX ORDER — PHENOBARBITAL 32.4 MG/1
32.4 TABLET ORAL 2 TIMES DAILY
Status: COMPLETED | OUTPATIENT
Start: 2025-06-22 | End: 2025-06-22

## 2025-06-20 RX ORDER — SODIUM CHLORIDE 0.9 % (FLUSH) 0.9 %
5-40 SYRINGE (ML) INJECTION EVERY 12 HOURS SCHEDULED
Status: DISCONTINUED | OUTPATIENT
Start: 2025-06-20 | End: 2025-06-21 | Stop reason: SDUPTHER

## 2025-06-20 RX ORDER — POLYETHYLENE GLYCOL 3350 17 G/17G
17 POWDER, FOR SOLUTION ORAL DAILY PRN
Status: DISCONTINUED | OUTPATIENT
Start: 2025-06-20 | End: 2025-06-25 | Stop reason: HOSPADM

## 2025-06-20 RX ORDER — SODIUM CHLORIDE 0.9 % (FLUSH) 0.9 %
5-40 SYRINGE (ML) INJECTION PRN
Status: DISCONTINUED | OUTPATIENT
Start: 2025-06-20 | End: 2025-06-21 | Stop reason: SDUPTHER

## 2025-06-20 RX ORDER — MAGNESIUM SULFATE IN WATER 40 MG/ML
2000 INJECTION, SOLUTION INTRAVENOUS PRN
Status: DISCONTINUED | OUTPATIENT
Start: 2025-06-20 | End: 2025-06-25 | Stop reason: HOSPADM

## 2025-06-20 RX ORDER — METHYLPREDNISOLONE SODIUM SUCCINATE 40 MG/ML
40 INJECTION INTRAMUSCULAR; INTRAVENOUS EVERY 6 HOURS
Status: DISCONTINUED | OUTPATIENT
Start: 2025-06-20 | End: 2025-06-23

## 2025-06-20 RX ORDER — 0.9 % SODIUM CHLORIDE 0.9 %
30 INTRAVENOUS SOLUTION INTRAVENOUS ONCE
Status: COMPLETED | OUTPATIENT
Start: 2025-06-20 | End: 2025-06-20

## 2025-06-20 RX ORDER — PHENOBARBITAL 32.4 MG/1
16.2 TABLET ORAL EVERY 6 HOURS PRN
Status: ACTIVE | OUTPATIENT
Start: 2025-06-22 | End: 2025-06-23

## 2025-06-20 RX ORDER — IPRATROPIUM BROMIDE AND ALBUTEROL SULFATE 2.5; .5 MG/3ML; MG/3ML
1 SOLUTION RESPIRATORY (INHALATION)
Status: COMPLETED | OUTPATIENT
Start: 2025-06-20 | End: 2025-06-20

## 2025-06-20 RX ORDER — IPRATROPIUM BROMIDE AND ALBUTEROL SULFATE 2.5; .5 MG/3ML; MG/3ML
1 SOLUTION RESPIRATORY (INHALATION)
Status: DISCONTINUED | OUTPATIENT
Start: 2025-06-21 | End: 2025-06-21

## 2025-06-20 RX ORDER — ONDANSETRON 2 MG/ML
4 INJECTION INTRAMUSCULAR; INTRAVENOUS EVERY 6 HOURS PRN
Status: DISCONTINUED | OUTPATIENT
Start: 2025-06-20 | End: 2025-06-25 | Stop reason: HOSPADM

## 2025-06-20 RX ORDER — POTASSIUM CHLORIDE 7.45 MG/ML
10 INJECTION INTRAVENOUS PRN
Status: DISCONTINUED | OUTPATIENT
Start: 2025-06-20 | End: 2025-06-25 | Stop reason: HOSPADM

## 2025-06-20 RX ORDER — POTASSIUM CHLORIDE 1500 MG/1
40 TABLET, EXTENDED RELEASE ORAL PRN
Status: DISCONTINUED | OUTPATIENT
Start: 2025-06-20 | End: 2025-06-25 | Stop reason: HOSPADM

## 2025-06-20 RX ORDER — ACETAMINOPHEN 325 MG/1
650 TABLET ORAL EVERY 6 HOURS PRN
Status: DISCONTINUED | OUTPATIENT
Start: 2025-06-20 | End: 2025-06-25 | Stop reason: HOSPADM

## 2025-06-20 RX ADMIN — NALOXONE HYDROCHLORIDE 1 MG: 1 INJECTION INTRAMUSCULAR; INTRAVENOUS; SUBCUTANEOUS at 21:10

## 2025-06-20 RX ADMIN — CEFTRIAXONE SODIUM 1000 MG: 1 INJECTION, POWDER, FOR SOLUTION INTRAMUSCULAR; INTRAVENOUS at 21:10

## 2025-06-20 RX ADMIN — IOPAMIDOL 100 ML: 755 INJECTION, SOLUTION INTRAVENOUS at 21:08

## 2025-06-20 RX ADMIN — SODIUM CHLORIDE 2097 ML: 9 INJECTION, SOLUTION INTRAVENOUS at 21:14

## 2025-06-20 RX ADMIN — IPRATROPIUM BROMIDE AND ALBUTEROL SULFATE 1 DOSE: .5; 2.5 SOLUTION RESPIRATORY (INHALATION) at 23:22

## 2025-06-20 RX ADMIN — AZITHROMYCIN 500 MG: 500 INJECTION, POWDER, LYOPHILIZED, FOR SOLUTION INTRAVENOUS at 21:14

## 2025-06-20 ASSESSMENT — PAIN - FUNCTIONAL ASSESSMENT: PAIN_FUNCTIONAL_ASSESSMENT: NONE - DENIES PAIN
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
## 2025-06-21 LAB
ALBUMIN SERPL-MCNC: 3.4 G/DL (ref 3.5–5)
ALBUMIN/GLOB SERPL: 1.1 (ref 1.1–2.2)
ALP SERPL-CCNC: 60 U/L (ref 45–117)
ALT SERPL-CCNC: 28 U/L (ref 12–78)
ANION GAP SERPL CALC-SCNC: 5 MMOL/L (ref 2–12)
AST SERPL W P-5'-P-CCNC: 11 U/L (ref 15–37)
BASOPHILS # BLD: 0.01 K/UL (ref 0–0.1)
BASOPHILS NFR BLD: 0.1 % (ref 0–1)
BILIRUB SERPL-MCNC: 0.2 MG/DL (ref 0.2–1)
BUN SERPL-MCNC: 13 MG/DL (ref 6–20)
BUN/CREAT SERPL: 16 (ref 12–20)
CA-I BLD-MCNC: 8.4 MG/DL (ref 8.5–10.1)
CHLORIDE SERPL-SCNC: 107 MMOL/L (ref 97–108)
CO2 SERPL-SCNC: 30 MMOL/L (ref 21–32)
CREAT SERPL-MCNC: 0.82 MG/DL (ref 0.7–1.3)
DIFFERENTIAL METHOD BLD: ABNORMAL
EKG ATRIAL RATE: 91 BPM
EKG DIAGNOSIS: NORMAL
EKG P AXIS: 63 DEGREES
EKG P-R INTERVAL: 184 MS
EKG Q-T INTERVAL: 352 MS
EKG QRS DURATION: 86 MS
EKG QTC CALCULATION (BAZETT): 432 MS
EKG R AXIS: -61 DEGREES
EKG T AXIS: 41 DEGREES
EKG VENTRICULAR RATE: 91 BPM
EOSINOPHIL # BLD: 0 K/UL (ref 0–0.4)
EOSINOPHIL NFR BLD: 0 % (ref 0–7)
ERYTHROCYTE [DISTWIDTH] IN BLOOD BY AUTOMATED COUNT: 12.7 % (ref 11.5–14.5)
FLUAV RNA SPEC QL NAA+PROBE: NOT DETECTED
FLUBV RNA SPEC QL NAA+PROBE: NOT DETECTED
GLOBULIN SER CALC-MCNC: 3.2 G/DL (ref 2–4)
GLUCOSE SERPL-MCNC: 107 MG/DL (ref 65–100)
HCT VFR BLD AUTO: 39.6 % (ref 36.6–50.3)
HGB BLD-MCNC: 13.2 G/DL (ref 12.1–17)
IMM GRANULOCYTES # BLD AUTO: 0.04 K/UL (ref 0–0.04)
IMM GRANULOCYTES NFR BLD AUTO: 0.5 % (ref 0–0.5)
LYMPHOCYTES # BLD: 0.36 K/UL (ref 0.8–3.5)
LYMPHOCYTES NFR BLD: 4.2 % (ref 12–49)
MCH RBC QN AUTO: 34 PG (ref 26–34)
MCHC RBC AUTO-ENTMCNC: 33.3 G/DL (ref 30–36.5)
MCV RBC AUTO: 102.1 FL (ref 80–99)
MONOCYTES # BLD: 0.24 K/UL (ref 0–1)
MONOCYTES NFR BLD: 2.8 % (ref 5–13)
NEUTS SEG # BLD: 7.85 K/UL (ref 1.8–8)
NEUTS SEG NFR BLD: 92.4 % (ref 32–75)
NRBC # BLD: 0 K/UL (ref 0–0.01)
NRBC BLD-RTO: 0 PER 100 WBC
PLATELET # BLD AUTO: 232 K/UL (ref 150–400)
PMV BLD AUTO: 9.3 FL (ref 8.9–12.9)
POTASSIUM SERPL-SCNC: 4.6 MMOL/L (ref 3.5–5.1)
PROT SERPL-MCNC: 6.6 G/DL (ref 6.4–8.2)
RBC # BLD AUTO: 3.88 M/UL (ref 4.1–5.7)
RBC MORPH BLD: ABNORMAL
SARS-COV-2 RNA RESP QL NAA+PROBE: NOT DETECTED
SODIUM SERPL-SCNC: 142 MMOL/L (ref 136–145)
WBC # BLD AUTO: 8.5 K/UL (ref 4.1–11.1)

## 2025-06-21 PROCEDURE — 6370000000 HC RX 637 (ALT 250 FOR IP): Performed by: INTERNAL MEDICINE

## 2025-06-21 PROCEDURE — 80053 COMPREHEN METABOLIC PANEL: CPT

## 2025-06-21 PROCEDURE — 85025 COMPLETE CBC W/AUTO DIFF WBC: CPT

## 2025-06-21 PROCEDURE — 6370000000 HC RX 637 (ALT 250 FOR IP): Performed by: FAMILY MEDICINE

## 2025-06-21 PROCEDURE — 94761 N-INVAS EAR/PLS OXIMETRY MLT: CPT

## 2025-06-21 PROCEDURE — 6360000002 HC RX W HCPCS: Performed by: FAMILY MEDICINE

## 2025-06-21 PROCEDURE — 2500000003 HC RX 250 WO HCPCS: Performed by: FAMILY MEDICINE

## 2025-06-21 PROCEDURE — 1100000000 HC RM PRIVATE

## 2025-06-21 PROCEDURE — 94640 AIRWAY INHALATION TREATMENT: CPT

## 2025-06-21 PROCEDURE — 87040 BLOOD CULTURE FOR BACTERIA: CPT

## 2025-06-21 PROCEDURE — 2700000000 HC OXYGEN THERAPY PER DAY

## 2025-06-21 PROCEDURE — 2580000003 HC RX 258: Performed by: FAMILY MEDICINE

## 2025-06-21 PROCEDURE — 87636 SARSCOV2 & INF A&B AMP PRB: CPT

## 2025-06-21 RX ORDER — IPRATROPIUM BROMIDE AND ALBUTEROL SULFATE 2.5; .5 MG/3ML; MG/3ML
1 SOLUTION RESPIRATORY (INHALATION)
Status: DISCONTINUED | OUTPATIENT
Start: 2025-06-21 | End: 2025-06-25 | Stop reason: HOSPADM

## 2025-06-21 RX ORDER — FOLIC ACID 1 MG/1
1 TABLET ORAL DAILY
Status: DISCONTINUED | OUTPATIENT
Start: 2025-06-21 | End: 2025-06-25 | Stop reason: HOSPADM

## 2025-06-21 RX ORDER — MULTIVITAMIN WITH IRON
1 TABLET ORAL DAILY
Status: DISCONTINUED | OUTPATIENT
Start: 2025-06-21 | End: 2025-06-25 | Stop reason: HOSPADM

## 2025-06-21 RX ADMIN — SODIUM CHLORIDE, PRESERVATIVE FREE 10 ML: 5 INJECTION INTRAVENOUS at 00:50

## 2025-06-21 RX ADMIN — ENOXAPARIN SODIUM 40 MG: 100 INJECTION SUBCUTANEOUS at 08:21

## 2025-06-21 RX ADMIN — PHENOBARBITAL 32.4 MG: 32.4 TABLET ORAL at 17:36

## 2025-06-21 RX ADMIN — METHYLPREDNISOLONE SODIUM SUCCINATE 40 MG: 40 INJECTION, POWDER, LYOPHILIZED, FOR SOLUTION INTRAMUSCULAR; INTRAVENOUS at 23:28

## 2025-06-21 RX ADMIN — METHYLPREDNISOLONE SODIUM SUCCINATE 40 MG: 40 INJECTION, POWDER, LYOPHILIZED, FOR SOLUTION INTRAMUSCULAR; INTRAVENOUS at 00:49

## 2025-06-21 RX ADMIN — ACETAMINOPHEN 650 MG: 325 TABLET ORAL at 00:58

## 2025-06-21 RX ADMIN — CEFTRIAXONE SODIUM 1000 MG: 1 INJECTION, POWDER, FOR SOLUTION INTRAMUSCULAR; INTRAVENOUS at 20:31

## 2025-06-21 RX ADMIN — Medication 5 MCG: at 09:05

## 2025-06-21 RX ADMIN — PHENOBARBITAL 32.4 MG: 32.4 TABLET ORAL at 20:30

## 2025-06-21 RX ADMIN — THIAMINE HCL TAB 100 MG 100 MG: 100 TAB at 08:20

## 2025-06-21 RX ADMIN — ACETAMINOPHEN 650 MG: 325 TABLET ORAL at 08:23

## 2025-06-21 RX ADMIN — IPRATROPIUM BROMIDE AND ALBUTEROL SULFATE 1 DOSE: 2.5; .5 SOLUTION RESPIRATORY (INHALATION) at 20:56

## 2025-06-21 RX ADMIN — Medication 2 PUFF: at 21:01

## 2025-06-21 RX ADMIN — PHENOBARBITAL 32.4 MG: 32.4 TABLET ORAL at 08:21

## 2025-06-21 RX ADMIN — METHYLPREDNISOLONE SODIUM SUCCINATE 40 MG: 40 INJECTION, POWDER, LYOPHILIZED, FOR SOLUTION INTRAMUSCULAR; INTRAVENOUS at 06:41

## 2025-06-21 RX ADMIN — Medication 2 PUFF: at 09:05

## 2025-06-21 RX ADMIN — SODIUM CHLORIDE: 0.9 INJECTION, SOLUTION INTRAVENOUS at 00:48

## 2025-06-21 RX ADMIN — METHYLPREDNISOLONE SODIUM SUCCINATE 40 MG: 40 INJECTION, POWDER, LYOPHILIZED, FOR SOLUTION INTRAMUSCULAR; INTRAVENOUS at 17:37

## 2025-06-21 RX ADMIN — THERA TABS 1 TABLET: TAB at 14:45

## 2025-06-21 RX ADMIN — PHENOBARBITAL 32.4 MG: 32.4 TABLET ORAL at 12:11

## 2025-06-21 RX ADMIN — FOLIC ACID 1 MG: 1 TABLET ORAL at 14:45

## 2025-06-21 RX ADMIN — METHYLPREDNISOLONE SODIUM SUCCINATE 40 MG: 40 INJECTION, POWDER, LYOPHILIZED, FOR SOLUTION INTRAMUSCULAR; INTRAVENOUS at 12:11

## 2025-06-21 RX ADMIN — PHENOBARBITAL 32.4 MG: 32.4 TABLET ORAL at 01:23

## 2025-06-21 RX ADMIN — SODIUM CHLORIDE, PRESERVATIVE FREE 10 ML: 5 INJECTION INTRAVENOUS at 20:40

## 2025-06-21 RX ADMIN — ACETAMINOPHEN 650 MG: 325 TABLET ORAL at 17:36

## 2025-06-21 RX ADMIN — AZITHROMYCIN MONOHYDRATE 500 MG: 500 INJECTION, POWDER, LYOPHILIZED, FOR SOLUTION INTRAVENOUS at 20:40

## 2025-06-21 RX ADMIN — SODIUM CHLORIDE: 0.9 INJECTION, SOLUTION INTRAVENOUS at 14:42

## 2025-06-21 ASSESSMENT — PAIN SCALES - GENERAL
PAINLEVEL_OUTOF10: 9
PAINLEVEL_OUTOF10: 9
PAINLEVEL_OUTOF10: 7
PAINLEVEL_OUTOF10: 0
PAINLEVEL_OUTOF10: 7
PAINLEVEL_OUTOF10: 5
PAINLEVEL_OUTOF10: 3
PAINLEVEL_OUTOF10: 0

## 2025-06-21 ASSESSMENT — PAIN DESCRIPTION - LOCATION
LOCATION: BACK
LOCATION: BACK;GENERALIZED
LOCATION: GENERALIZED

## 2025-06-21 ASSESSMENT — PAIN - FUNCTIONAL ASSESSMENT: PAIN_FUNCTIONAL_ASSESSMENT: PREVENTS OR INTERFERES SOME ACTIVE ACTIVITIES AND ADLS

## 2025-06-21 ASSESSMENT — PAIN DESCRIPTION - DESCRIPTORS
DESCRIPTORS: ACHING

## 2025-06-21 ASSESSMENT — PAIN DESCRIPTION - ORIENTATION: ORIENTATION: LOWER;POSTERIOR
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
## 2025-06-21 NOTE — CARE COORDINATION
06/21/25 1014   Service Assessment   Patient Orientation Person;Place   Cognition Alert   History Provided By Patient   Primary Caregiver Spouse   Support Systems Spouse/Significant Other   Patient's Healthcare Decision Maker is: Legal Next of Kin   PCP Verified by CM Yes   Last Visit to PCP Within last 3 months   Prior Functional Level Assistance with the following:;Bathing;Cooking;Housework;Shopping;Mobility   Current Functional Level Assistance with the following:;Bathing;Cooking;Housework;Shopping;Mobility   Can patient return to prior living arrangement Yes   Ability to make needs known: Poor   Family able to assist with home care needs: Yes   Would you like for me to discuss the discharge plan with any other family members/significant others, and if so, who? Yes  (wife)   Financial Resources Medicare   Community Resources None   Social/Functional History   Lives With Spouse;Parent   Type of Home House   Bathroom Shower/Tub Shower chair without back   Home Equipment Oxygen;Walker - Rolling   Receives Help From Personal care attendant;Family   Prior Level of Assist for ADLs Needs assistance   Toileting Needs assistance   Ambulation Assistance Needs assistance   Prior Level of Assist for Transfers Independent   Occupation Retired   Discharge Planning   Type of Residence House   Living Arrangements Spouse/Significant Other   Current Services Prior To Admission Oxygen Therapy   Patient expects to be discharged to: House   Services At/After Discharge   Services At/After Discharge Home Health   Confirm Follow Up Transport Family     CM met with the patient at the bedside. Initially he answered most questions correctly but then started having some difficulty mixing up names and information regarding his oxygen and care at home. Patient reported he lives at home with his wife and mother. He does use oxygen at home and his portable oxygen tank is at the bedside but patient cannot recall who the DME provider is.

## 2025-06-21 NOTE — PLAN OF CARE
Problem: Discharge Planning  Goal: Discharge to home or other facility with appropriate resources  Outcome: Progressing  Flowsheets (Taken 6/21/2025 0058)  Discharge to home or other facility with appropriate resources: Identify barriers to discharge with patient and caregiver     Problem: Seizure Precautions  Goal: Remains free of injury related to seizures activity  Outcome: Progressing  Flowsheets (Taken 6/21/2025 0058)  Remains free of injury related to seizure activity:   Maintain airway, patient safety  and administer oxygen as ordered   Monitor patient for seizure activity, document and report duration and description of seizure to Licensed Independent Practitioner

## 2025-06-21 NOTE — H&P
Hospitalist Admission Note    NAME: Yakov Moseley   :  1955   MRN:  [id number]     Date/Time:  2025 11:23 PM    Patient PCP: Lenard Sams MD    ______________________________________________________________________  Given the patient's current clinical presentation, I have a high level of concern for decompensation if discharged from the emergency department.  Complex decision making was performed, which includes reviewing the patient's available past medical records, laboratory results, and x-ray films.       My assessment of this patient's clinical condition and my plan of care is as follows.    Assessment / Plan:    Alcohol intoxication  Hypoxia 2/2 COPD exacerbation on 3L O2 at home      Admit to medical service   Start on symbicort 160-4.5 mcg/act 2 puff inhaler twice daily  Start on Spiriva Respimat inhaler 5 mcg 2 puff daily  Start on DuoNeb nebulizer  Start IV Solu-Medrol 40 mg daily  Start IV NS  Continue on IV Rocephin and IV Zithromax started in ER  Continue low flow nasal cannula O2 3L   Current Facility-Administered Medications   Medication Dose Route Frequency Provider Last Rate Last Admin    budesonide-formoterol (SYMBICORT) 160-4.5 MCG/ACT inhaler 2 puff  2 puff Inhalation BID RT Jamse Dallas MD        And    [START ON 2025] tiotropium (SPIRIVA RESPIMAT) 2.5 MCG/ACT inhaler 5 mcg  2 puff Inhalation Daily RT James Dallas MD        0.9 % sodium chloride infusion   IntraVENous Continuous James Dallas MD        sodium chloride flush 0.9 % injection 5-40 mL  5-40 mL IntraVENous 2 times per day James Dallas MD        sodium chloride flush 0.9 % injection 5-40 mL  5-40 mL IntraVENous PRN James Dallas MD        0.9 % sodium chloride infusion   IntraVENous PRN James Dallas MD        potassium chloride (KLOR-CON M) extended release tablet 40 mEq  40 mEq Oral PRN James Dallas MD        Or    potassium bicarb-citric acid (EFFER-K) effervescent  Ratio 20 12 - 20      Est, Glom Filt Rate >90 >60 ml/min/1.73m2    Calcium 9.2 8.5 - 10.1 mg/dL    Total Bilirubin 0.2 0.2 - 1.0 mg/dL    AST 10 (L) 15 - 37 U/L    ALT 30 12 - 78 U/L    Alk Phosphatase 62 45 - 117 U/L    Total Protein 6.3 (L) 6.4 - 8.2 g/dL    Albumin 3.5 3.5 - 5.0 g/dL    Globulin 2.8 2.0 - 4.0 g/dL    Albumin/Globulin Ratio 1.3 1.1 - 2.2     TSH    Collection Time: 06/20/25  7:49 PM   Result Value Ref Range    TSH, 3rd Generation 0.61 0.36 - 3.74 uIU/mL   ETOH    Collection Time: 06/20/25  7:49 PM   Result Value Ref Range    Ethanol Lvl 186 (H) <10 mg/dL   POCT Glucose    Collection Time: 06/20/25  7:57 PM   Result Value Ref Range    POC Glucose 134 (H) 65 - 100 mg/dL    Performed by: Jennie Verde    Urinalysis    Collection Time: 06/20/25  8:07 PM   Result Value Ref Range    Color, UA Yellow/Straw      Appearance Clear Clear      Specific Gravity, UA <1.005 1.003 - 1.030    pH, Urine 6.0 5.0 - 8.0      Protein, UA Negative Negative mg/dL    Glucose, Ur Negative Negative mg/dL    Ketones, Urine Negative Negative mg/dL    Bilirubin, Urine Negative Negative      Blood, Urine Negative Negative      Urobilinogen, Urine 0.1 0.1 - 1.0 EU/dL    Nitrite, Urine Negative Negative      Leukocyte Esterase, Urine Negative Negative      WBC, UA 0-4 0 - 4 /hpf    RBC, UA 0-5 0 - 5 /hpf    Epithelial Cells, UA Few Few /lpf    BACTERIA, URINE Negative Negative /hpf   Urine Drug Screen    Collection Time: 06/20/25  8:07 PM   Result Value Ref Range    Amphetamine, Urine Negative Negative      Barbiturates, Urine Negative Negative      Benzodiazepines, Urine Negative Negative      Cocaine, Urine Negative Negative      Methadone, Urine Negative Negative      Opiates, Urine Negative Negative      Phencyclidine, Urine Negative Negative      THC, TH-Cannabinol, Urine Positive (A) Negative      Comments:        This test is a screen for drugs of abuse in a medical setting only (i.e., they are unconfirmed results and as
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
## 2025-06-21 NOTE — CONSULTS
IMPRESSION:   Acute hypoxic respiratory failure  Chronic hypoxic respiratory failure  Acute on chronic hypercapnic respiratory failure  Acute exacerbation of COPD  EtOH abuse  Pt is at high risk of sudden decline and decompensation with life threatening consequenses and continued end organ dysfunction and failure  Pt is critically ill. Time spent with pt and staff actively rendering care, managing pt and coordinating care as stated below; 55 minutes, exclusive of any procedures      RECOMMENDATIONS/PLAN:   70-year-old male came in because of shortness of breath and dyspnea significant past medical of chronic hypoxic respiratory failure chronically on home oxygen also had a history of COPD continues to drink he quit smoking 2 years ago according to him he has a pulse oximetry at home he was in his car and drinking liquor started feeling short of breath dyspneic saturation was in the 70s he was put on oxygen 3 L nasal cannula saturation came up to 72% oxygen was increased started on neb treatment CAT scan of the spine was done which was negative he was put on 100% nonrebreather mask which has been weaned to nasal cannula  IV Solu-Medrol nebulizer treatment he is on Spiriva and Symbicort  Will get arterial blood gases to see the pCO2 level which was elevated 60.3 probably he will need BiPAP at night  CT chest negative  Thiamine and  folic acid  On oxygen 3 L nasal Cannula oxygen as salvage oxygen delivery device to provide high concentration of oxygen to overcome refractory hypoxia;   Transfuse prn to maintain Hgb > 7  Labs to follow electrolytes, renal function and and blood counts  Bronchial hygiene with respiratory therapy techniques, bronchodilators  Pt needs IV fluids with additives and Drug therapy requiring intensive monitoring for toxicity  Prescription drug management with home med reconciliation reviewed  DVT, SUP prophylaxis     [x] High complexity decision making was performed  [x] See my orders for  69.9 kg (154 lb)   02/14/23 86.2 kg (190 lb 0.6 oz)          Intake/Output Summary (Last 24 hours) at 6/21/2025 0805  Last data filed at 6/21/2025 0200  Gross per 24 hour   Intake --   Output 200 ml   Net -200 ml       Last shift:      No intake/output data recorded.  Last 3 shifts: 06/19 1901 - 06/21 0700  In: -   Out: 200 [Urine:200]       Physical Exam:     General: Alert awake short of breath dyspneic on oxygen 5 L nasal cannula  HEENT: NCAT, poor dentition, lips and mucosa dry  Eyes: anicteric; conjunctiva clear  Neck: no nodes, trach midline; no accessory MM use.  Chest: no deformity,   Cardiac: R regular; no murmur;   Lungs: distant breath sounds; diminished breath sound bilaterally  Abd: soft, NT, hypoactive BS  Ext: no edema; no joint swelling; No clubbing  : NO mandujano, clear urine  Neuro: No focal neurological deficit  Psych- no agitation, oriented to person;   Skin: warm, dry, no cyanosis;   Pulses: 1-2+ Bilateral pedal, radial  Capillary: brisk; pale      DATA:  No results found for this or any previous visit.    No results found for this or any previous visit.       MAR reviewed and pertinent medications noted or modified as needed    MEDS:   Current Facility-Administered Medications   Medication Dose Route Frequency Provider Last Rate Last Admin    budesonide-formoterol (SYMBICORT) 160-4.5 MCG/ACT inhaler 2 puff  2 puff Inhalation BID RT James Dallas MD        And    tiotropium (SPIRIVA RESPIMAT) 2.5 MCG/ACT inhaler 5 mcg  2 puff Inhalation Daily RT James Dallas MD        0.9 % sodium chloride infusion   IntraVENous Continuous James Dallas MD 75 mL/hr at 06/21/25 0048 New Bag at 06/21/25 0048    sodium chloride flush 0.9 % injection 5-40 mL  5-40 mL IntraVENous 2 times per day James Dallas MD   10 mL at 06/21/25 0050    sodium chloride flush 0.9 % injection 5-40 mL  5-40 mL IntraVENous PRN James Dallas MD        0.9 % sodium chloride infusion   IntraVENous PRN Celena  arterial level. There are no filling defects within the opacified portions of the pulmonary arterial tree.  The thoracic aorta is normal in caliber.  There is no pericardial effusion. There is no significant lymphadenopathy.  There is no pleural effusion, pneumothorax, or airspace disease. There is emphysema. There are no acute fractures or aggressive appearing bony abnormalities.     No evidence of pulmonary embolism. No other acute abnormalities. Electronically signed by Booyah    CT Head W/O Contrast  Result Date: 6/20/2025  EXAM: CT HEAD WO CONTRAST ACC#: [id number] INDICATION: AMS COMPARISON: None. TECHNIQUE: Routine CT of the head was performed without intravenous contrast. Coronal and sagittal reconstructions were generated. CT dose reduction was achieved through use of a standardized protocol tailored for this examination and automatic exposure control for dose modulation. FINDINGS: The ventricles are normal size and configuration. There is no mass, mass effect, or acute hemorrhage. There is no CT evidence of acute ischemia. There is periventricular hypodensity compatible with chronic small vessel disease. There are no acute bony abnormalities. Superior right craniectomy is noted. There is fluid in the right maxillary sinus.     No acute intracranial abnormalities. Electronically signed by Booyah    XR CHEST 1 VIEW  Result Date: 6/20/2025  INDICATION:   Confusion/AMS COMPARISON: 2/16/2023 FINDINGS: AP portable upright view of the chest demonstrates a stable  cardiopericardial silhouette.There is no pleural effusion.There is no focal consolidation.. Chronic interstitial change with chronic apical pleural thickening and scar. Centrilobular emphysema.     No acute intrathoracic process is identified. Electronically signed by BRIANNA MORFIN       This care involved high complexity decision making which includes independently reviewing the patient's past medical records, current laboratory results,
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
## 2025-06-21 NOTE — CONSULTS
- 400 K/uL    MPV 9.3 8.9 - 12.9 FL    Nucleated RBCs 0.0 0.0  WBC    nRBC 0.00 0.00 - 0.01 K/uL    Neutrophils % 92.4 (H) 32.0 - 75.0 %    Lymphocytes % 4.2 (L) 12.0 - 49.0 %    Monocytes % 2.8 (L) 5.0 - 13.0 %    Eosinophils % 0.0 0.0 - 7.0 %    Basophils % 0.1 0.0 - 1.0 %    Immature Granulocytes % 0.5 0 - 0.5 %    Neutrophils Absolute 7.85 1.80 - 8.00 K/UL    Lymphocytes Absolute 0.36 (L) 0.80 - 3.50 K/UL    Monocytes Absolute 0.24 0.00 - 1.00 K/UL    Eosinophils Absolute 0.00 0.00 - 0.40 K/UL    Basophils Absolute 0.01 0.00 - 0.10 K/UL    Immature Granulocytes Absolute 0.04 0.00 - 0.04 K/UL    Differential Type Smear Scanned      RBC Comment Macrocytosis  1+       Blood Culture 2    Collection Time: 06/21/25 12:48 AM    Specimen: Blood   Result Value Ref Range    Special Requests No Special Requests      Culture No growth after 12 hours         CTA chest (6/20/2025):  FINDINGS:  This is a good quality study for evaluation of pulmonary embolism to the first  subsegmental arterial level.     There are no filling defects within the opacified portions of the pulmonary  arterial tree.       The thoracic aorta is normal in caliber.  There is no pericardial effusion.   There is no significant lymphadenopathy.       There is no pleural effusion, pneumothorax, or airspace disease. There is  emphysema.     There are no acute fractures or aggressive appearing bony abnormalities.     IMPRESSION:  No evidence of pulmonary embolism. No other acute abnormalities.      Assessment:     Patient is a 70-year-old  male with a history of COPD, prior nicotine dependence, EtOH abuse, chronic hypoxemic respiratory failure on 2 to 3 L nasal cannula, and anxiety/depression who presented back to the hospital for acute on chronic hypoxia and an acute exacerbation of COPD.    Plan:     1.)  Acute on chronic hypoxemic respiratory failure  - Secondary to acute exacerbation of COPD, targeted therapies outlined below in  detail.  -This is the patient's 3rd or 4th hospitalization in the past few months.  He has some significant social problems at home.  When he recently left [facility], he was set up to get evaluated by an outpatient hospice team.   consulted to assist with getting more information regarding potential home hospice services.  -Previously on 5 to 6 L nasal cannula, now weaned down to 4 L nasal cannula.  Wean down to home 2 to 3 L nasal cannula for goal sats greater than 90%.  -CTA without evidence of pneumonia or PE.    2.)  Acute exacerbation of COPD  - Follows with Dr. Tamayo in the office.  Chronically on Trelegy 100 mcg inhaler 1 puff once daily.  -Agree with Solu-Medrol 40 mg IV every 6 hours, IV azithromycin/Rocephin, Symbicort 160/4.5 inhaler 2 puffs twice daily, and DuoNeb nebulizer treatments every 6 hours while awake.  -Patient has already quit smoking, encouragement given.  -Sending off expanded infectious workup.    3.)  Lactic acidosis  - Lactate 2.37 on admission, likely secondary to acute illness.  -Currently on normal saline at 75 cc/h, repeat lactate level in the morning.    4.)  Acute metabolic encephalopathy  - Seems back to normal now, CT imaging of the brain and cervical spine unremarkable.  -Likely secondary to hypoxia.  ABG on admission with chronic hypercarbic respiratory failure which is relatively stable.  -Sending off expanded serum altered mental status workup in the morning.    5.)  EtOH abuse  - Currently on phenobarbital taper as well as as needed oral phenobarbital.  -Likely worsening underlying mood disorder  -UDS also positive for THC  -Continue daily thiamine, folic acid, and multivitamin    6.)  Anxiety/depression  - Has a known diagnosis, declined psychiatric evaluation this admission.  -Recommend doing a medication check, would recommend restarting patient's SSRI/SNRI.      CODE STATUS: Full Code       Disposition and Family: Stable to transfer to  floor.    Total time spent with patient: 55 minutes      Compa Moreno DO  [facility] (PAT)  6/21/2025  1:43 PM

## 2025-06-21 NOTE — PLAN OF CARE
Problem: Discharge Planning  Goal: Discharge to home or other facility with appropriate resources  6/21/2025 1222 by Beronica Aguillon RN  Outcome: Progressing  6/21/2025 0249 by Nichelle Warren RN  Outcome: Progressing  Flowsheets (Taken 6/21/2025 0058)  Discharge to home or other facility with appropriate resources: Identify barriers to discharge with patient and caregiver     Problem: Seizure Precautions  Goal: Remains free of injury related to seizures activity  6/21/2025 1222 by Beronica Aguillon RN  Outcome: Progressing  Flowsheets (Taken 6/21/2025 0938 by Akshat Haro)  Remains free of injury related to seizure activity: Maintain airway, patient safety  and administer oxygen as ordered  6/21/2025 0249 by Nichelle Warren RN  Outcome: Progressing  Flowsheets (Taken 6/21/2025 0058)  Remains free of injury related to seizure activity:   Maintain airway, patient safety  and administer oxygen as ordered   Monitor patient for seizure activity, document and report duration and description of seizure to Licensed Independent Practitioner

## 2025-06-22 ENCOUNTER — APPOINTMENT (OUTPATIENT)
Facility: HOSPITAL | Age: 70
DRG: 190 | End: 2025-06-22
Payer: MEDICARE

## 2025-06-22 LAB
25(OH)D3 SERPL-MCNC: 41.5 NG/ML (ref 30–100)
ALBUMIN SERPL-MCNC: 3.1 G/DL (ref 3.5–5)
ANION GAP SERPL CALC-SCNC: 3 MMOL/L (ref 2–12)
ARTERIAL PATENCY WRIST A: YES
BASE EXCESS BLDA CALC-SCNC: 3.7 MMOL/L (ref 0–3)
BDY SITE: ABNORMAL
BNP SERPL-MCNC: 775 PG/ML
BODY TEMPERATURE: 98.6
BUN SERPL-MCNC: 16 MG/DL (ref 6–20)
BUN/CREAT SERPL: 18 (ref 12–20)
CA-I BLD-MCNC: 8.6 MG/DL (ref 8.5–10.1)
CHLORIDE SERPL-SCNC: 107 MMOL/L (ref 97–108)
CO2 SERPL-SCNC: 32 MMOL/L (ref 21–32)
COHGB MFR BLD: 0.3 % (ref 1–2)
CREAT SERPL-MCNC: 0.88 MG/DL (ref 0.7–1.3)
ERYTHROCYTE [DISTWIDTH] IN BLOOD BY AUTOMATED COUNT: 12.5 % (ref 11.5–14.5)
FIO2 ON VENT: 32 %
FOLATE SERPL-MCNC: 15.9 NG/ML (ref 5–21)
GAS FLOW.O2 O2 DELIVERY SYS: 3 L/MIN
GLUCOSE SERPL-MCNC: 130 MG/DL (ref 65–100)
HCO3 BLDA-SCNC: 30 MMOL/L (ref 22–26)
HCT VFR BLD AUTO: 34.4 % (ref 36.6–50.3)
HGB BLD-MCNC: 11.3 G/DL (ref 12.1–17)
LACTATE SERPL-SCNC: 1.2 MMOL/L (ref 0.4–2)
M PNEUMO IGM SER IA-ACNC: NONREACTIVE
MAGNESIUM SERPL-MCNC: 1.9 MG/DL (ref 1.6–2.4)
MCH RBC QN AUTO: 34 PG (ref 26–34)
MCHC RBC AUTO-ENTMCNC: 32.8 G/DL (ref 30–36.5)
MCV RBC AUTO: 103.6 FL (ref 80–99)
METHGB MFR BLD: 0.3 % (ref 0–1.4)
NRBC # BLD: 0 K/UL (ref 0–0.01)
NRBC BLD-RTO: 0 PER 100 WBC
OXYHGB MFR BLD: 93.9 % (ref 95–99)
PCO2 BLDA: 50 MMHG (ref 35–45)
PERFORMED BY:: ABNORMAL
PH BLDA: 7.39 (ref 7.35–7.45)
PHOSPHATE SERPL-MCNC: 2.8 MG/DL (ref 2.6–4.7)
PLATELET # BLD AUTO: 193 K/UL (ref 150–400)
PMV BLD AUTO: 9.6 FL (ref 8.9–12.9)
PO2 BLDA: 76 MMHG (ref 80–100)
POTASSIUM SERPL-SCNC: 4.4 MMOL/L (ref 3.5–5.1)
RBC # BLD AUTO: 3.32 M/UL (ref 4.1–5.7)
SAO2 % BLD: 95 % (ref 95–99)
SAO2% DEVICE SAO2% SENSOR NAME: ABNORMAL
SODIUM SERPL-SCNC: 142 MMOL/L (ref 136–145)
SPECIMEN SITE: ABNORMAL
VENTILATION MODE VENT: ABNORMAL
VIT B12 SERPL-MCNC: 280 PG/ML (ref 193–986)
WBC # BLD AUTO: 10.1 K/UL (ref 4.1–11.1)

## 2025-06-22 PROCEDURE — 83605 ASSAY OF LACTIC ACID: CPT

## 2025-06-22 PROCEDURE — 6370000000 HC RX 637 (ALT 250 FOR IP): Performed by: INTERNAL MEDICINE

## 2025-06-22 PROCEDURE — 36415 COLL VENOUS BLD VENIPUNCTURE: CPT

## 2025-06-22 PROCEDURE — 6370000000 HC RX 637 (ALT 250 FOR IP): Performed by: FAMILY MEDICINE

## 2025-06-22 PROCEDURE — 2580000003 HC RX 258: Performed by: FAMILY MEDICINE

## 2025-06-22 PROCEDURE — 1100000000 HC RM PRIVATE

## 2025-06-22 PROCEDURE — 86738 MYCOPLASMA ANTIBODY: CPT

## 2025-06-22 PROCEDURE — 82803 BLOOD GASES ANY COMBINATION: CPT

## 2025-06-22 PROCEDURE — 6360000002 HC RX W HCPCS: Performed by: FAMILY MEDICINE

## 2025-06-22 PROCEDURE — 2500000003 HC RX 250 WO HCPCS: Performed by: FAMILY MEDICINE

## 2025-06-22 PROCEDURE — 94640 AIRWAY INHALATION TREATMENT: CPT

## 2025-06-22 PROCEDURE — 83735 ASSAY OF MAGNESIUM: CPT

## 2025-06-22 PROCEDURE — 94761 N-INVAS EAR/PLS OXIMETRY MLT: CPT

## 2025-06-22 PROCEDURE — 80069 RENAL FUNCTION PANEL: CPT

## 2025-06-22 PROCEDURE — 82607 VITAMIN B-12: CPT

## 2025-06-22 PROCEDURE — 71045 X-RAY EXAM CHEST 1 VIEW: CPT

## 2025-06-22 PROCEDURE — 85027 COMPLETE CBC AUTOMATED: CPT

## 2025-06-22 PROCEDURE — 36600 WITHDRAWAL OF ARTERIAL BLOOD: CPT

## 2025-06-22 PROCEDURE — 83880 ASSAY OF NATRIURETIC PEPTIDE: CPT

## 2025-06-22 PROCEDURE — 82746 ASSAY OF FOLIC ACID SERUM: CPT

## 2025-06-22 PROCEDURE — 82306 VITAMIN D 25 HYDROXY: CPT

## 2025-06-22 PROCEDURE — 2700000000 HC OXYGEN THERAPY PER DAY

## 2025-06-22 RX ORDER — GUAIFENESIN 600 MG/1
1200 TABLET, EXTENDED RELEASE ORAL 2 TIMES DAILY
Status: DISCONTINUED | OUTPATIENT
Start: 2025-06-22 | End: 2025-06-23

## 2025-06-22 RX ADMIN — CEFTRIAXONE SODIUM 1000 MG: 1 INJECTION, POWDER, FOR SOLUTION INTRAMUSCULAR; INTRAVENOUS at 21:11

## 2025-06-22 RX ADMIN — SODIUM CHLORIDE: 0.9 INJECTION, SOLUTION INTRAVENOUS at 05:18

## 2025-06-22 RX ADMIN — ENOXAPARIN SODIUM 40 MG: 100 INJECTION SUBCUTANEOUS at 09:19

## 2025-06-22 RX ADMIN — IPRATROPIUM BROMIDE AND ALBUTEROL SULFATE 1 DOSE: 2.5; .5 SOLUTION RESPIRATORY (INHALATION) at 08:58

## 2025-06-22 RX ADMIN — THERA TABS 1 TABLET: TAB at 09:19

## 2025-06-22 RX ADMIN — METHYLPREDNISOLONE SODIUM SUCCINATE 40 MG: 40 INJECTION, POWDER, LYOPHILIZED, FOR SOLUTION INTRAMUSCULAR; INTRAVENOUS at 06:05

## 2025-06-22 RX ADMIN — THIAMINE HCL TAB 100 MG 100 MG: 100 TAB at 09:19

## 2025-06-22 RX ADMIN — IPRATROPIUM BROMIDE AND ALBUTEROL SULFATE 1 DOSE: 2.5; .5 SOLUTION RESPIRATORY (INHALATION) at 19:05

## 2025-06-22 RX ADMIN — GUAIFENESIN 1200 MG: 600 TABLET, EXTENDED RELEASE ORAL at 21:10

## 2025-06-22 RX ADMIN — FOLIC ACID 1 MG: 1 TABLET ORAL at 09:19

## 2025-06-22 RX ADMIN — METHYLPREDNISOLONE SODIUM SUCCINATE 40 MG: 40 INJECTION, POWDER, LYOPHILIZED, FOR SOLUTION INTRAMUSCULAR; INTRAVENOUS at 11:15

## 2025-06-22 RX ADMIN — Medication 2 PUFF: at 08:58

## 2025-06-22 RX ADMIN — Medication 2 PUFF: at 19:05

## 2025-06-22 RX ADMIN — GUAIFENESIN 1200 MG: 600 TABLET, EXTENDED RELEASE ORAL at 11:18

## 2025-06-22 RX ADMIN — ACETAMINOPHEN 650 MG: 325 TABLET ORAL at 11:18

## 2025-06-22 RX ADMIN — SODIUM CHLORIDE: 0.9 INJECTION, SOLUTION INTRAVENOUS at 21:19

## 2025-06-22 RX ADMIN — SODIUM CHLORIDE, PRESERVATIVE FREE 10 ML: 5 INJECTION INTRAVENOUS at 21:11

## 2025-06-22 RX ADMIN — IPRATROPIUM BROMIDE AND ALBUTEROL SULFATE 1 DOSE: 2.5; .5 SOLUTION RESPIRATORY (INHALATION) at 14:16

## 2025-06-22 RX ADMIN — AZITHROMYCIN MONOHYDRATE 500 MG: 500 INJECTION, POWDER, LYOPHILIZED, FOR SOLUTION INTRAVENOUS at 21:23

## 2025-06-22 RX ADMIN — PHENOBARBITAL 32.4 MG: 32.4 TABLET ORAL at 09:19

## 2025-06-22 RX ADMIN — PHENOBARBITAL 32.4 MG: 32.4 TABLET ORAL at 21:10

## 2025-06-22 RX ADMIN — METHYLPREDNISOLONE SODIUM SUCCINATE 40 MG: 40 INJECTION, POWDER, LYOPHILIZED, FOR SOLUTION INTRAMUSCULAR; INTRAVENOUS at 18:06

## 2025-06-22 ASSESSMENT — PAIN SCALES - GENERAL
PAINLEVEL_OUTOF10: 4
PAINLEVEL_OUTOF10: 0
PAINLEVEL_OUTOF10: 9
PAINLEVEL_OUTOF10: 0

## 2025-06-22 ASSESSMENT — PAIN DESCRIPTION - LOCATION: LOCATION: HEAD

## 2025-06-22 ASSESSMENT — PAIN DESCRIPTION - DESCRIPTORS: DESCRIPTORS: ACHING

## 2025-06-22 NOTE — PROGRESS NOTES
Pulmonology progress note  Patient is a 70-year-old  male with a history of COPD, prior nicotine dependence, EtOH abuse, chronic hypoxemic respiratory failure on 2 to 3 L nasal cannula, and anxiety/depression who presented back to the hospital for acute on chronic hypoxia and an acute exacerbation of COPD.  Patient seen and examined in his room on the floor this afternoon, overnight events noted.  Patient has had multiple hospitalizations at [facility] in the past few months for similar issues.  Seems to have a lot of social problems as well as uncontrolled anxiety/depression and what appears to be substance abuse with alcohol abuse.  Patient is currently on a phenobarbital taper for prevention of alcohol withdrawals.  ABG on arrival was 7.3 4/60/68   CTA chest on admission showing no PE, but does show bilateral for CEMA, no pneumonia.  Subjective:     Patient examined at bedside  Has been on bronchodilators, nasal cannula oxygen.  Followed in antibiotics.  Overall has shown improvement.  Still has cough with expiratory wheezing.  He is known to have severe COPD with FEV1 of only 20% of predicted with FEV1/FVC ratio of 62  As an outpatient he has been on Trelegy inhaler 100 mcg 1 puff once daily            PMHx:  Anxiety/depression, COPD, chronic hypoxemic respiratory failure, EtOH abuse, ex-smoker    PSHx:  Denies    FHx:  Denies known family members with COVID-19 infection    Social History     Tobacco Use    Smoking status: Not on file    Smokeless tobacco: Not on file   Substance Use Topics    Alcohol use: Not on file      Current Facility-Administered Medications   Medication Dose Route Frequency Provider Last Rate Last Admin    guaiFENesin (MUCINEX) extended release tablet 1,200 mg  1,200 mg Oral BID Gt Sage MD   1,200 mg at 06/22/25 1118    ipratropium 0.5 mg-albuterol 2.5 mg (DUONEB) nebulizer solution 1 Dose  1 Dose Inhalation Q6H WA RT Compa Moreno DO   1 Dose at 06/22/25 1416  floor.    Total time spent with patient: 30 minutes      Antonino Tamayo MD  [facility] ([facility]  6/22/2025  5:16 PM

## 2025-06-22 NOTE — PLAN OF CARE
Problem: Discharge Planning  Goal: Discharge to home or other facility with appropriate resources  6/21/2025 2315 by Nichelle Warren RN  Outcome: Progressing  Flowsheets (Taken 6/21/2025 2029)  Discharge to home or other facility with appropriate resources: Identify barriers to discharge with patient and caregiver  6/21/2025 1222 by Beronica Aguillon RN  Outcome: Progressing     Problem: Seizure Precautions  Goal: Remains free of injury related to seizures activity  6/21/2025 2315 by Nichelle Warren RN  Outcome: Progressing  Flowsheets (Taken 6/21/2025 0938 by Akshat Haro)  Remains free of injury related to seizure activity: Maintain airway, patient safety  and administer oxygen as ordered  6/21/2025 1222 by Beronica Aguillon RN  Outcome: Progressing  Flowsheets (Taken 6/21/2025 0938 by Akshat Haro)  Remains free of injury related to seizure activity: Maintain airway, patient safety  and administer oxygen as ordered     Problem: Pain  Goal: Verbalizes/displays adequate comfort level or baseline comfort level  Outcome: Progressing  Flowsheets (Taken 6/21/2025 2315)  Verbalizes/displays adequate comfort level or baseline comfort level: Encourage patient to monitor pain and request assistance     Problem: Safety - Adult  Goal: Free from fall injury  Outcome: Progressing

## 2025-06-22 NOTE — PLAN OF CARE
Problem: Discharge Planning  Goal: Discharge to home or other facility with appropriate resources  6/22/2025 0926 by Beronica Aguillon RN  Outcome: Progressing  6/21/2025 2315 by Nichelle Warren RN  Outcome: Progressing  Flowsheets (Taken 6/21/2025 2029)  Discharge to home or other facility with appropriate resources: Identify barriers to discharge with patient and caregiver     Problem: Seizure Precautions  Goal: Remains free of injury related to seizures activity  6/22/2025 0926 by Beronica Aguillon RN  Outcome: Progressing  6/21/2025 2315 by Nichelle Warren RN  Outcome: Progressing  Flowsheets (Taken 6/21/2025 0938 by Akshat Haro)  Remains free of injury related to seizure activity: Maintain airway, patient safety  and administer oxygen as ordered     Problem: Pain  Goal: Verbalizes/displays adequate comfort level or baseline comfort level  6/22/2025 0926 by Beronica Aguillon RN  Outcome: Progressing  6/21/2025 2315 by Nichelle Warren RN  Outcome: Progressing  Flowsheets (Taken 6/21/2025 2315)  Verbalizes/displays adequate comfort level or baseline comfort level: Encourage patient to monitor pain and request assistance     Problem: Safety - Adult  Goal: Free from fall injury  6/22/2025 0926 by Beronica Aguillon RN  Outcome: Progressing  6/21/2025 2315 by Nichelle Warren RN  Outcome: Progressing
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
## 2025-06-23 LAB
ALBUMIN SERPL-MCNC: 3.1 G/DL (ref 3.5–5)
ANION GAP SERPL CALC-SCNC: 4 MMOL/L (ref 2–12)
BUN SERPL-MCNC: 16 MG/DL (ref 6–20)
BUN/CREAT SERPL: 20 (ref 12–20)
CA-I BLD-MCNC: 8.6 MG/DL (ref 8.5–10.1)
CHLORIDE SERPL-SCNC: 107 MMOL/L (ref 97–108)
CO2 SERPL-SCNC: 30 MMOL/L (ref 21–32)
CREAT SERPL-MCNC: 0.8 MG/DL (ref 0.7–1.3)
ERYTHROCYTE [DISTWIDTH] IN BLOOD BY AUTOMATED COUNT: 12.7 % (ref 11.5–14.5)
GLUCOSE SERPL-MCNC: 116 MG/DL (ref 65–100)
HCT VFR BLD AUTO: 35.5 % (ref 36.6–50.3)
HGB BLD-MCNC: 11.7 G/DL (ref 12.1–17)
MAGNESIUM SERPL-MCNC: 1.8 MG/DL (ref 1.6–2.4)
MCH RBC QN AUTO: 33.8 PG (ref 26–34)
MCHC RBC AUTO-ENTMCNC: 33 G/DL (ref 30–36.5)
MCV RBC AUTO: 102.6 FL (ref 80–99)
NRBC # BLD: 0 K/UL (ref 0–0.01)
NRBC BLD-RTO: 0 PER 100 WBC
PHOSPHATE SERPL-MCNC: 2.8 MG/DL (ref 2.6–4.7)
PLATELET # BLD AUTO: 200 K/UL (ref 150–400)
PMV BLD AUTO: 9.7 FL (ref 8.9–12.9)
POTASSIUM SERPL-SCNC: 4.1 MMOL/L (ref 3.5–5.1)
RBC # BLD AUTO: 3.46 M/UL (ref 4.1–5.7)
SODIUM SERPL-SCNC: 141 MMOL/L (ref 136–145)
WBC # BLD AUTO: 10.3 K/UL (ref 4.1–11.1)

## 2025-06-23 PROCEDURE — 2500000003 HC RX 250 WO HCPCS: Performed by: FAMILY MEDICINE

## 2025-06-23 PROCEDURE — 85027 COMPLETE CBC AUTOMATED: CPT

## 2025-06-23 PROCEDURE — 94761 N-INVAS EAR/PLS OXIMETRY MLT: CPT

## 2025-06-23 PROCEDURE — 6370000000 HC RX 637 (ALT 250 FOR IP): Performed by: INTERNAL MEDICINE

## 2025-06-23 PROCEDURE — 6370000000 HC RX 637 (ALT 250 FOR IP): Performed by: FAMILY MEDICINE

## 2025-06-23 PROCEDURE — 6360000002 HC RX W HCPCS: Performed by: FAMILY MEDICINE

## 2025-06-23 PROCEDURE — 36415 COLL VENOUS BLD VENIPUNCTURE: CPT

## 2025-06-23 PROCEDURE — 80069 RENAL FUNCTION PANEL: CPT

## 2025-06-23 PROCEDURE — 1100000000 HC RM PRIVATE

## 2025-06-23 PROCEDURE — 83735 ASSAY OF MAGNESIUM: CPT

## 2025-06-23 PROCEDURE — 94640 AIRWAY INHALATION TREATMENT: CPT

## 2025-06-23 PROCEDURE — 2700000000 HC OXYGEN THERAPY PER DAY

## 2025-06-23 PROCEDURE — 2580000003 HC RX 258: Performed by: FAMILY MEDICINE

## 2025-06-23 RX ORDER — GUAIFENESIN 600 MG/1
600 TABLET, EXTENDED RELEASE ORAL 2 TIMES DAILY
Status: DISCONTINUED | OUTPATIENT
Start: 2025-06-23 | End: 2025-06-25 | Stop reason: HOSPADM

## 2025-06-23 RX ORDER — BENZONATATE 100 MG/1
200 CAPSULE ORAL 3 TIMES DAILY
Status: DISCONTINUED | OUTPATIENT
Start: 2025-06-23 | End: 2025-06-25 | Stop reason: HOSPADM

## 2025-06-23 RX ORDER — METHYLPREDNISOLONE SODIUM SUCCINATE 40 MG/ML
40 INJECTION INTRAMUSCULAR; INTRAVENOUS EVERY 12 HOURS
Status: DISCONTINUED | OUTPATIENT
Start: 2025-06-23 | End: 2025-06-25

## 2025-06-23 RX ORDER — GUAIFENESIN/DEXTROMETHORPHAN 100-10MG/5
10 SYRUP ORAL 3 TIMES DAILY
Status: DISCONTINUED | OUTPATIENT
Start: 2025-06-23 | End: 2025-06-25 | Stop reason: HOSPADM

## 2025-06-23 RX ADMIN — CHLORDIAZEPOXIDE HYDROCHLORIDE 25 MG: 10 CAPSULE ORAL at 20:16

## 2025-06-23 RX ADMIN — CEFTRIAXONE SODIUM 1000 MG: 1 INJECTION, POWDER, FOR SOLUTION INTRAMUSCULAR; INTRAVENOUS at 20:18

## 2025-06-23 RX ADMIN — METHYLPREDNISOLONE SODIUM SUCCINATE 40 MG: 40 INJECTION, POWDER, LYOPHILIZED, FOR SOLUTION INTRAMUSCULAR; INTRAVENOUS at 00:04

## 2025-06-23 RX ADMIN — THERA TABS 1 TABLET: TAB at 08:06

## 2025-06-23 RX ADMIN — BENZONATATE 200 MG: 100 CAPSULE ORAL at 15:00

## 2025-06-23 RX ADMIN — IPRATROPIUM BROMIDE AND ALBUTEROL SULFATE 1 DOSE: 2.5; .5 SOLUTION RESPIRATORY (INHALATION) at 20:46

## 2025-06-23 RX ADMIN — ACETAMINOPHEN 650 MG: 325 TABLET ORAL at 10:48

## 2025-06-23 RX ADMIN — Medication 2 PUFF: at 07:41

## 2025-06-23 RX ADMIN — ENOXAPARIN SODIUM 40 MG: 100 INJECTION SUBCUTANEOUS at 08:06

## 2025-06-23 RX ADMIN — THIAMINE HCL TAB 100 MG 100 MG: 100 TAB at 08:06

## 2025-06-23 RX ADMIN — GUAIFENESIN 600 MG: 600 TABLET, EXTENDED RELEASE ORAL at 20:18

## 2025-06-23 RX ADMIN — SODIUM CHLORIDE, PRESERVATIVE FREE 10 ML: 5 INJECTION INTRAVENOUS at 20:18

## 2025-06-23 RX ADMIN — IPRATROPIUM BROMIDE AND ALBUTEROL SULFATE 1 DOSE: 2.5; .5 SOLUTION RESPIRATORY (INHALATION) at 14:02

## 2025-06-23 RX ADMIN — FOLIC ACID 1 MG: 1 TABLET ORAL at 08:06

## 2025-06-23 RX ADMIN — AZITHROMYCIN MONOHYDRATE 500 MG: 500 INJECTION, POWDER, LYOPHILIZED, FOR SOLUTION INTRAVENOUS at 20:26

## 2025-06-23 RX ADMIN — GUAIFENESIN AND DEXTROMETHORPHAN 10 ML: 100; 10 SYRUP ORAL at 15:00

## 2025-06-23 RX ADMIN — PHENOBARBITAL 16.2 MG: 32.4 TABLET ORAL at 08:06

## 2025-06-23 RX ADMIN — METHYLPREDNISOLONE SODIUM SUCCINATE 40 MG: 40 INJECTION, POWDER, LYOPHILIZED, FOR SOLUTION INTRAMUSCULAR; INTRAVENOUS at 06:09

## 2025-06-23 RX ADMIN — ACETAMINOPHEN 650 MG: 325 TABLET ORAL at 20:15

## 2025-06-23 RX ADMIN — METHYLPREDNISOLONE SODIUM SUCCINATE 40 MG: 40 INJECTION, POWDER, LYOPHILIZED, FOR SOLUTION INTRAMUSCULAR; INTRAVENOUS at 10:48

## 2025-06-23 RX ADMIN — GUAIFENESIN 1200 MG: 600 TABLET, EXTENDED RELEASE ORAL at 08:06

## 2025-06-23 RX ADMIN — IPRATROPIUM BROMIDE AND ALBUTEROL SULFATE 1 DOSE: 2.5; .5 SOLUTION RESPIRATORY (INHALATION) at 07:41

## 2025-06-23 RX ADMIN — Medication 2 PUFF: at 20:46

## 2025-06-23 RX ADMIN — GUAIFENESIN AND DEXTROMETHORPHAN 10 ML: 100; 10 SYRUP ORAL at 20:18

## 2025-06-23 RX ADMIN — BENZONATATE 200 MG: 100 CAPSULE ORAL at 20:18

## 2025-06-23 RX ADMIN — SODIUM CHLORIDE: 0.9 INJECTION, SOLUTION INTRAVENOUS at 11:58

## 2025-06-23 ASSESSMENT — PAIN SCALES - GENERAL
PAINLEVEL_OUTOF10: 8
PAINLEVEL_OUTOF10: 9
PAINLEVEL_OUTOF10: 0
PAINLEVEL_OUTOF10: 0
PAINLEVEL_OUTOF10: 3

## 2025-06-23 ASSESSMENT — PAIN DESCRIPTION - LOCATION
LOCATION: HEAD
LOCATION: HEAD;OTHER (COMMENT)

## 2025-06-23 ASSESSMENT — PAIN DESCRIPTION - DESCRIPTORS
DESCRIPTORS: ACHING
DESCRIPTORS: ACHING
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
## 2025-06-23 NOTE — PROGRESS NOTES
Pulmonology progress note    Subjective:     Patient seen and examined in his room on the floor this afternoon, no acute events overnight.  Saturating well on home 3 L nasal cannula, feels much better from a respiratory standpoint.  CBC/BMP stable, lactate normalized, most recent ABG was 7.3 9/50/76.  Negative COVID-19/influenza testing as well as mycoplasma testing.  Can discontinue normal saline today.  Chest x-ray from 6/22 showing no acute findings.  Continue with cough meds 3 times daily, decrease steroids to every 12 hours.   evaluation still pending.    Otherwise, he is cleared for discharge from a pulmonary standpoint.                   Current Facility-Administered Medications   Medication Dose Route Frequency Provider Last Rate Last Admin    methylPREDNISolone sodium succ (SOLU-MEDROL) injection 40 mg  40 mg IntraVENous Q12H Compa Moreno DO        benzonatate (TESSALON) capsule 200 mg  200 mg Oral TID Compa Moreno DO        guaiFENesin-dextromethorphan (ROBITUSSIN DM) 100-10 MG/5ML syrup 10 mL  10 mL Oral TID Compa Moreno DO        guaiFENesin (MUCINEX) extended release tablet 600 mg  600 mg Oral BID Compa Moreno DO        ipratropium 0.5 mg-albuterol 2.5 mg (DUONEB) nebulizer solution 1 Dose  1 Dose Inhalation Q6H WA RT Compa Moreno DO   1 Dose at 06/23/25 0741    folic acid (FOLVITE) tablet 1 mg  1 mg Oral Daily Compa Moreno DO   1 mg at 06/23/25 0806    multivitamin 1 tablet  1 tablet Oral Daily Compa Moreno DO   1 tablet at 06/23/25 0806    budesonide-formoterol (SYMBICORT) 160-4.5 MCG/ACT inhaler 2 puff  2 puff Inhalation BID RT James Dallas MD   2 puff at 06/23/25 0741    sodium chloride flush 0.9 % injection 5-40 mL  5-40 mL IntraVENous 2 times per day James Dallas MD   10 mL at 06/22/25 2111    sodium chloride flush 0.9 % injection 5-40 mL  5-40 mL IntraVENous PRN James Dallas MD        0.9 % sodium chloride infusion   IntraVENous PRN James Dallas MD         potassium chloride (KLOR-CON M) extended release tablet 40 mEq  40 mEq Oral PRN James Dallas MD        Or    potassium bicarb-citric acid (EFFER-K) effervescent tablet 40 mEq  40 mEq Oral PRN James Dallas MD        Or    potassium chloride 10 mEq/100 mL IVPB (Peripheral Line)  10 mEq IntraVENous PRN James Dallas MD        magnesium sulfate 2000 mg in 50 mL IVPB premix  2,000 mg IntraVENous PRN James Dallas MD        enoxaparin (LOVENOX) injection 40 mg  40 mg SubCUTAneous Daily James Dallas MD   40 mg at 06/23/25 0806    ondansetron (ZOFRAN-ODT) disintegrating tablet 4 mg  4 mg Oral Q8H PRN James Dallas MD        Or    ondansetron (ZOFRAN) injection 4 mg  4 mg IntraVENous Q6H PRN James Dallas MD        polyethylene glycol (GLYCOLAX) packet 17 g  17 g Oral Daily PRN James Dallas MD        acetaminophen (TYLENOL) tablet 650 mg  650 mg Oral Q6H PRN James Dallas MD   650 mg at 06/23/25 1048    Or    acetaminophen (TYLENOL) suppository 650 mg  650 mg Rectal Q6H PRN James Dallas MD        cefTRIAXone (ROCEPHIN) 1,000 mg in sterile water 10 mL IV syringe  1,000 mg IntraVENous Q24H James Dallas MD   1,000 mg at 06/22/25 2111    And    azithromycin (ZITHROMAX) 500 mg in sodium chloride 0.9 % 250 mL IVPB ([id number])  500 mg IntraVENous Q24H James Dallas MD   Stopped at 06/22/25 2223    thiamine mononitrate tablet 100 mg  100 mg Oral Daily James Dallas MD   100 mg at 06/23/25 0806    PHENobarbital tablet 16.2 mg  16.2 mg Oral BID James Dallas MD   16.2 mg at 06/23/25 0806    PHENobarbital tablet 16.2 mg  16.2 mg Oral Q6H PRN James Dallas MD            Home Meds:  No current facility-administered medications on file prior to encounter.     Current Outpatient Medications on File Prior to Encounter   Medication Sig Dispense Refill    Budeson-Glycopyrrol-Formoterol (BREZTRI AEROSPHERE) 160-9-4.8 MCG/ACT AERO Inhale 2 puffs into the lungs 2 times daily

## 2025-06-23 NOTE — CARE COORDINATION
CM reviewed chart. CM met with patient, patient stated DCP is the [facility]. Patient unsure of hospice agency. CM left  for spouse for callback confirm DCP and obtain name of hospice agency. CM will continue to follow.

## 2025-06-23 NOTE — PLAN OF CARE
Problem: Discharge Planning  Goal: Discharge to home or other facility with appropriate resources  6/22/2025 2231 by Nichelle Warren RN  Outcome: Progressing  Flowsheets (Taken 6/22/2025 2120)  Discharge to home or other facility with appropriate resources: Identify barriers to discharge with patient and caregiver  6/22/2025 0926 by Beronica Aguillon RN  Outcome: Progressing     Problem: Seizure Precautions  Goal: Remains free of injury related to seizures activity  6/22/2025 2231 by Nichelle Warren RN  Outcome: Progressing  Flowsheets (Taken 6/21/2025 0938 by Akshat Haro)  Remains free of injury related to seizure activity: Maintain airway, patient safety  and administer oxygen as ordered  6/22/2025 0926 by Beronica Aguillon RN  Outcome: Progressing     Problem: Pain  Goal: Verbalizes/displays adequate comfort level or baseline comfort level  6/22/2025 2231 by Nichelle Warren RN  Outcome: Progressing  Flowsheets (Taken 6/21/2025 2315)  Verbalizes/displays adequate comfort level or baseline comfort level: Encourage patient to monitor pain and request assistance  6/22/2025 0926 by Beronica Aguillon RN  Outcome: Progressing     Problem: Safety - Adult  Goal: Free from fall injury  6/22/2025 2231 by Nichelle Warren RN  Outcome: Progressing  Flowsheets (Taken 6/22/2025 2231)  Free From Fall Injury: Instruct family/caregiver on patient safety  6/22/2025 0926 by Beronica Aguillon RN  Outcome: Progressing

## 2025-06-23 NOTE — PLAN OF CARE
Problem: Discharge Planning  Goal: Discharge to home or other facility with appropriate resources  6/23/2025 0828 by Beronica Aguillon RN  Outcome: Progressing  6/22/2025 2231 by Nichelle Warren RN  Outcome: Progressing  Flowsheets (Taken 6/22/2025 2120)  Discharge to home or other facility with appropriate resources: Identify barriers to discharge with patient and caregiver     Problem: Seizure Precautions  Goal: Remains free of injury related to seizures activity  6/23/2025 0828 by Beronica Aguillon RN  Outcome: Progressing  6/22/2025 2231 by Nichelle Warren RN  Outcome: Progressing  Flowsheets (Taken 6/21/2025 0938 by Akshat Haro)  Remains free of injury related to seizure activity: Maintain airway, patient safety  and administer oxygen as ordered     Problem: Pain  Goal: Verbalizes/displays adequate comfort level or baseline comfort level  6/23/2025 0828 by Beronica Aguillon RN  Outcome: Progressing  6/22/2025 2231 by Nichelle Warren RN  Outcome: Progressing  Flowsheets (Taken 6/21/2025 2315)  Verbalizes/displays adequate comfort level or baseline comfort level: Encourage patient to monitor pain and request assistance     Problem: Safety - Adult  Goal: Free from fall injury  6/23/2025 0828 by Beronica Aguillon RN  Outcome: Progressing  6/22/2025 2231 by Nichelle Warren RN  Outcome: Progressing  Flowsheets (Taken 6/22/2025 2231)  Free From Fall Injury: Instruct family/caregiver on patient safety

## 2025-06-24 LAB
ALBUMIN SERPL-MCNC: 3 G/DL (ref 3.5–5)
ANION GAP SERPL CALC-SCNC: 3 MMOL/L (ref 2–12)
BNP SERPL-MCNC: 2523 PG/ML
BUN SERPL-MCNC: 18 MG/DL (ref 6–20)
BUN/CREAT SERPL: 24 (ref 12–20)
CA-I BLD-MCNC: 8.6 MG/DL (ref 8.5–10.1)
CHLORIDE SERPL-SCNC: 106 MMOL/L (ref 97–108)
CO2 SERPL-SCNC: 30 MMOL/L (ref 21–32)
CREAT SERPL-MCNC: 0.76 MG/DL (ref 0.7–1.3)
ERYTHROCYTE [DISTWIDTH] IN BLOOD BY AUTOMATED COUNT: 12.8 % (ref 11.5–14.5)
GLUCOSE SERPL-MCNC: 106 MG/DL (ref 65–100)
HCT VFR BLD AUTO: 34.5 % (ref 36.6–50.3)
HGB BLD-MCNC: 11.5 G/DL (ref 12.1–17)
MAGNESIUM SERPL-MCNC: 1.8 MG/DL (ref 1.6–2.4)
MCH RBC QN AUTO: 34.2 PG (ref 26–34)
MCHC RBC AUTO-ENTMCNC: 33.3 G/DL (ref 30–36.5)
MCV RBC AUTO: 102.7 FL (ref 80–99)
NRBC # BLD: 0 K/UL (ref 0–0.01)
NRBC BLD-RTO: 0 PER 100 WBC
PHOSPHATE SERPL-MCNC: 2.3 MG/DL (ref 2.6–4.7)
PLATELET # BLD AUTO: 177 K/UL (ref 150–400)
PMV BLD AUTO: 9.7 FL (ref 8.9–12.9)
POTASSIUM SERPL-SCNC: 4 MMOL/L (ref 3.5–5.1)
RBC # BLD AUTO: 3.36 M/UL (ref 4.1–5.7)
SODIUM SERPL-SCNC: 139 MMOL/L (ref 136–145)
WBC # BLD AUTO: 8.3 K/UL (ref 4.1–11.1)

## 2025-06-24 PROCEDURE — 6360000002 HC RX W HCPCS: Performed by: INTERNAL MEDICINE

## 2025-06-24 PROCEDURE — 6370000000 HC RX 637 (ALT 250 FOR IP): Performed by: FAMILY MEDICINE

## 2025-06-24 PROCEDURE — 36415 COLL VENOUS BLD VENIPUNCTURE: CPT

## 2025-06-24 PROCEDURE — 85027 COMPLETE CBC AUTOMATED: CPT

## 2025-06-24 PROCEDURE — 83735 ASSAY OF MAGNESIUM: CPT

## 2025-06-24 PROCEDURE — 1100000000 HC RM PRIVATE

## 2025-06-24 PROCEDURE — 2700000000 HC OXYGEN THERAPY PER DAY

## 2025-06-24 PROCEDURE — 80069 RENAL FUNCTION PANEL: CPT

## 2025-06-24 PROCEDURE — 97530 THERAPEUTIC ACTIVITIES: CPT

## 2025-06-24 PROCEDURE — 97161 PT EVAL LOW COMPLEX 20 MIN: CPT

## 2025-06-24 PROCEDURE — 6360000002 HC RX W HCPCS: Performed by: FAMILY MEDICINE

## 2025-06-24 PROCEDURE — 6370000000 HC RX 637 (ALT 250 FOR IP): Performed by: INTERNAL MEDICINE

## 2025-06-24 PROCEDURE — 94640 AIRWAY INHALATION TREATMENT: CPT

## 2025-06-24 PROCEDURE — 2500000003 HC RX 250 WO HCPCS: Performed by: FAMILY MEDICINE

## 2025-06-24 PROCEDURE — 94761 N-INVAS EAR/PLS OXIMETRY MLT: CPT

## 2025-06-24 PROCEDURE — 83880 ASSAY OF NATRIURETIC PEPTIDE: CPT

## 2025-06-24 RX ADMIN — GUAIFENESIN AND DEXTROMETHORPHAN 10 ML: 100; 10 SYRUP ORAL at 10:17

## 2025-06-24 RX ADMIN — DIBASIC SODIUM PHOSPHATE, MONOBASIC POTASSIUM PHOSPHATE AND MONOBASIC SODIUM PHOSPHATE 2 TABLET: 852; 155; 130 TABLET ORAL at 13:40

## 2025-06-24 RX ADMIN — BENZONATATE 200 MG: 100 CAPSULE ORAL at 20:13

## 2025-06-24 RX ADMIN — GUAIFENESIN 600 MG: 600 TABLET, EXTENDED RELEASE ORAL at 20:14

## 2025-06-24 RX ADMIN — IPRATROPIUM BROMIDE AND ALBUTEROL SULFATE 1 DOSE: 2.5; .5 SOLUTION RESPIRATORY (INHALATION) at 07:21

## 2025-06-24 RX ADMIN — METHYLPREDNISOLONE SODIUM SUCCINATE 40 MG: 40 INJECTION, POWDER, LYOPHILIZED, FOR SOLUTION INTRAMUSCULAR; INTRAVENOUS at 10:24

## 2025-06-24 RX ADMIN — SODIUM CHLORIDE, PRESERVATIVE FREE 10 ML: 5 INJECTION INTRAVENOUS at 09:58

## 2025-06-24 RX ADMIN — BENZONATATE 200 MG: 100 CAPSULE ORAL at 14:54

## 2025-06-24 RX ADMIN — METHYLPREDNISOLONE SODIUM SUCCINATE 40 MG: 40 INJECTION, POWDER, LYOPHILIZED, FOR SOLUTION INTRAMUSCULAR; INTRAVENOUS at 23:23

## 2025-06-24 RX ADMIN — METHYLPREDNISOLONE SODIUM SUCCINATE 40 MG: 40 INJECTION, POWDER, LYOPHILIZED, FOR SOLUTION INTRAMUSCULAR; INTRAVENOUS at 00:54

## 2025-06-24 RX ADMIN — THERA TABS 1 TABLET: TAB at 10:14

## 2025-06-24 RX ADMIN — IPRATROPIUM BROMIDE AND ALBUTEROL SULFATE 1 DOSE: 2.5; .5 SOLUTION RESPIRATORY (INHALATION) at 14:38

## 2025-06-24 RX ADMIN — SODIUM CHLORIDE, PRESERVATIVE FREE 10 ML: 5 INJECTION INTRAVENOUS at 20:14

## 2025-06-24 RX ADMIN — FOLIC ACID 1 MG: 1 TABLET ORAL at 10:12

## 2025-06-24 RX ADMIN — GUAIFENESIN AND DEXTROMETHORPHAN 10 ML: 100; 10 SYRUP ORAL at 14:54

## 2025-06-24 RX ADMIN — IPRATROPIUM BROMIDE AND ALBUTEROL SULFATE 1 DOSE: 2.5; .5 SOLUTION RESPIRATORY (INHALATION) at 19:31

## 2025-06-24 RX ADMIN — Medication 2 PUFF: at 07:21

## 2025-06-24 RX ADMIN — GUAIFENESIN 600 MG: 600 TABLET, EXTENDED RELEASE ORAL at 10:16

## 2025-06-24 RX ADMIN — CEFTRIAXONE SODIUM 1000 MG: 1 INJECTION, POWDER, FOR SOLUTION INTRAMUSCULAR; INTRAVENOUS at 20:13

## 2025-06-24 RX ADMIN — BENZONATATE 200 MG: 100 CAPSULE ORAL at 10:13

## 2025-06-24 RX ADMIN — CHLORDIAZEPOXIDE HYDROCHLORIDE 25 MG: 10 CAPSULE ORAL at 20:13

## 2025-06-24 RX ADMIN — CHLORDIAZEPOXIDE HYDROCHLORIDE 25 MG: 10 CAPSULE ORAL at 10:15

## 2025-06-24 RX ADMIN — Medication 2 PUFF: at 19:47

## 2025-06-24 RX ADMIN — THIAMINE HCL TAB 100 MG 100 MG: 100 TAB at 10:11

## 2025-06-24 RX ADMIN — ENOXAPARIN SODIUM 40 MG: 100 INJECTION SUBCUTANEOUS at 10:17

## 2025-06-24 RX ADMIN — GUAIFENESIN AND DEXTROMETHORPHAN 10 ML: 100; 10 SYRUP ORAL at 20:13

## 2025-06-24 RX ADMIN — ACETAMINOPHEN 650 MG: 325 TABLET ORAL at 09:52

## 2025-06-24 ASSESSMENT — PAIN SCALES - GENERAL
PAINLEVEL_OUTOF10: 8
PAINLEVEL_OUTOF10: 0
PAINLEVEL_OUTOF10: 8
PAINLEVEL_OUTOF10: 3

## 2025-06-24 ASSESSMENT — PAIN DESCRIPTION - LOCATION
LOCATION: HEAD
LOCATION: HEAD

## 2025-06-24 ASSESSMENT — PAIN DESCRIPTION - DESCRIPTORS: DESCRIPTORS: ACHING

## 2025-06-24 NOTE — PLAN OF CARE
PHYSICAL THERAPY EVALUATION  Patient: Yakov Moseley (70 y.o. male)  Date: 6/24/2025  Primary Diagnosis: COPD (chronic obstructive pulmonary disease) (Formerly [facility]) [J44.9]       Precautions: Fall Risk, General Precautions                      Recommendations for nursing mobility: Encourage HEP in prep for ADLs/mobility; see handout for details    In place during session: Peripheral IV, Nasal Cannula 3L, and EKG/telemetry     ASSESSMENT  Pt is a 70 y.o. male admitted on 6/20/2025 for SOB; pt currently being treated for exacerbation of COPD . Pt sitting at eob upon PT arrival, agreeable to evaluation. Pt A&O x 4.      Based on the objective data described below, the patient currently presents with poor safety awareness. (See below for objective details and assist levels).     Overall pt tolerated session fair today with decreased gen strength and endurance. Pt required sba for bed mobility and transfers. Pt amb 30 feet with RW(patient was using RW in room ,required height adjusting,adjusted to patient's height), gt belt and sba ; demonstrates decreased endurance. Pt will benefit from continued skilled PT to address above deficits and return to PLOF. Current PT DC recommendation Intermittent physical therapy up to 2-3x/week in previous living setting with additional support needed for ADLs once medically appropriate.      GOALS:  Problem: Physical Therapy - Adult  Goal: By Discharge: Performs mobility at highest level of function for planned discharge setting.  See evaluation for individualized goals.  Description: FUNCTIONAL STATUS PRIOR TO ADMISSION: Patient was modified independent using a rolling walker and single point cane for functional mobility.    HOME SUPPORT PRIOR TO ADMISSION: The patient lived with family and required minimal assistance for ADLs.    Physical Therapy Goals  Initiated 6/24/2025  Pt stated goal: Get better  Pt will be I with [facility] HEP in 7 days.  Pt will perform bed mobility with Modified [place]

## 2025-06-24 NOTE — PLAN OF CARE
Problem: Discharge Planning  Goal: Discharge to home or other facility with appropriate resources  6/24/2025 1025 by Beronica Aguillon RN  Outcome: Progressing  6/24/2025 0119 by Lynda Hernandez RN  Outcome: Progressing     Problem: Seizure Precautions  Goal: Remains free of injury related to seizures activity  6/24/2025 1025 by Beronica Aguillon RN  Outcome: Progressing  6/24/2025 0119 by Lynda Hernandez RN  Outcome: Progressing     Problem: Pain  Goal: Verbalizes/displays adequate comfort level or baseline comfort level  6/24/2025 1025 by Beronica Aguillon RN  Outcome: Progressing  6/24/2025 0119 by Lynda Hernandez RN  Outcome: Progressing     Problem: Safety - Adult  Goal: Free from fall injury  6/24/2025 1025 by Beronica Aguillon RN  Outcome: Progressing  6/24/2025 0119 by Lynda Hernandez RN  Outcome: Progressing
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
## 2025-06-24 NOTE — CARE COORDINATION
CM reviewed chart. DCP home with [facility] resumption when medicallly ready. CM will continue to follow.

## 2025-06-24 NOTE — PROGRESS NOTES
Pulmonology progress note    Subjective:     Patient seen and examined in his room on the floor this afternoon, no acute events overnight.  Saturating well on home 3 L nasal cannula, feels much better from a respiratory standpoint.  CBC/BMP stable, phosphorus low 2.3, getting oral Neutra-Phos today.  proBNP level trending up slightly, but does not look fluid overloaded on exam.  Likely can switch to prednisone tomorrow if still admitted.  Otherwise, he is cleared for discharge from a pulmonary standpoint.                   Current Facility-Administered Medications   Medication Dose Route Frequency Provider Last Rate Last Admin    phosphorus (K PHOS NEUTRAL) 2 tablet  500 mg Oral Once Compa Moreno DO        methylPREDNISolone sodium succ (SOLU-MEDROL) injection 40 mg  40 mg IntraVENous Q12H Compa Moreno DO   40 mg at 06/24/25 1024    benzonatate (TESSALON) capsule 200 mg  200 mg Oral TID Compa Moreno DO   200 mg at 06/24/25 1013    guaiFENesin-dextromethorphan (ROBITUSSIN DM) 100-10 MG/5ML syrup 10 mL  10 mL Oral TID Compa Moreno DO   10 mL at 06/24/25 1017    guaiFENesin (MUCINEX) extended release tablet 600 mg  600 mg Oral BID Compa Moreno DO   600 mg at 06/24/25 1016    chlordiazePOXIDE (LIBRIUM) capsule 25 mg  25 mg Oral BID Gt Sage MD   25 mg at 06/24/25 1015    ipratropium 0.5 mg-albuterol 2.5 mg (DUONEB) nebulizer solution 1 Dose  1 Dose Inhalation Q6H WA RT Compa Moreno DO   1 Dose at 06/24/25 0721    folic acid (FOLVITE) tablet 1 mg  1 mg Oral Daily Compa Moreno DO   1 mg at 06/24/25 1012    multivitamin 1 tablet  1 tablet Oral Daily Compa Moreno DO   1 tablet at 06/24/25 1014    budesonide-formoterol (SYMBICORT) 160-4.5 MCG/ACT inhaler 2 puff  2 puff Inhalation BID RT James Dallas MD   2 puff at 06/24/25 0721    sodium chloride flush 0.9 % injection 5-40 mL  5-40 mL IntraVENous 2 times per day James Dallas MD   10 mL at 06/24/25 0958    sodium chloride flush 0.9 % injection 5-40  services.  -Previously on 5 to 6 L nasal cannula, now weaned down to 3 L nasal cannula.  Wean down to home 2 to 3 L nasal cannula for goal sats greater than 90%.  -ABG was done on 3 L which showed 7.39/50/76/30/95%  -CTA without evidence of pneumonia or PE.  - Cleared for discharge from a pulmonary standpoint.    2.)  Acute exacerbation of COPD  - Follows with Dr. Tamayo in the office.  Chronically on Trelegy 100 mcg inhaler 1 puff once daily.  He is known to have severe COPD with FEV1 of only 20% of predicted with FEV1/FVC ratio of 62  As an outpatient he has been on Trelegy inhaler 100 mcg 1 puff once daily  -Agree with Solu-Medrol 40 mg IV every 12 hours, IV azithromycin/Rocephin, Symbicort 160/4.5 inhaler 2 puffs twice daily, and DuoNeb nebulizer treatments every 6 hours while awake.  -Likely switch to prednisone tomorrow.  -Patient has already quit smoking, encouragement given.  - Expanded infectious workup negative.    3.)  Lactic acidosis  - Lactate 2.37 on admission, likely secondary to acute illness.  - Lactate now normalized, normal saline discontinued    4.)  Acute metabolic encephalopathy  - Seems back to normal now, CT imaging of the brain and cervical spine unremarkable.  -Likely secondary to hypoxia.  ABG on admission with chronic hypercarbic respiratory failure which is relatively stable.  -Sending off expanded serum altered mental status workup in the morning.    5.)  EtOH abuse  - Currently on phenobarbital taper as well as as needed oral phenobarbital.  -Likely worsening underlying mood disorder  -UDS also positive for THC  -Continue daily thiamine, folic acid, and multivitamin    6.)  Anxiety/depression  - Has a known diagnosis, declined psychiatric evaluation this admission.  -Recommend doing a medication check, would recommend restarting patient's SSRI/SNRI.      CODE STATUS: Full Code       Disposition and Family: Stable to transfer to floor.    Total time spent with patient: 30

## 2025-06-25 VITALS
RESPIRATION RATE: 16 BRPM | WEIGHT: 186.51 LBS | HEART RATE: 94 BPM | HEIGHT: 76 IN | SYSTOLIC BLOOD PRESSURE: 128 MMHG | OXYGEN SATURATION: 95 % | TEMPERATURE: 97.3 F | BODY MASS INDEX: 22.71 KG/M2 | DIASTOLIC BLOOD PRESSURE: 81 MMHG

## 2025-06-25 PROCEDURE — 6370000000 HC RX 637 (ALT 250 FOR IP): Performed by: INTERNAL MEDICINE

## 2025-06-25 PROCEDURE — 2700000000 HC OXYGEN THERAPY PER DAY

## 2025-06-25 PROCEDURE — 94640 AIRWAY INHALATION TREATMENT: CPT

## 2025-06-25 PROCEDURE — 2500000003 HC RX 250 WO HCPCS: Performed by: FAMILY MEDICINE

## 2025-06-25 PROCEDURE — 6370000000 HC RX 637 (ALT 250 FOR IP): Performed by: FAMILY MEDICINE

## 2025-06-25 PROCEDURE — 6360000002 HC RX W HCPCS: Performed by: FAMILY MEDICINE

## 2025-06-25 PROCEDURE — 94761 N-INVAS EAR/PLS OXIMETRY MLT: CPT

## 2025-06-25 PROCEDURE — 6360000002 HC RX W HCPCS: Performed by: INTERNAL MEDICINE

## 2025-06-25 PROCEDURE — 6360000002 HC RX W HCPCS

## 2025-06-25 RX ORDER — MULTIVITAMIN WITH IRON
1 TABLET ORAL DAILY
Qty: 30 TABLET | Refills: 0 | Status: SHIPPED | OUTPATIENT
Start: 2025-06-26 | End: 2025-07-26

## 2025-06-25 RX ORDER — FUROSEMIDE 10 MG/ML
40 INJECTION INTRAMUSCULAR; INTRAVENOUS ONCE
Status: COMPLETED | OUTPATIENT
Start: 2025-06-25 | End: 2025-06-25

## 2025-06-25 RX ORDER — METHYLPREDNISOLONE SODIUM SUCCINATE 40 MG/ML
40 INJECTION INTRAMUSCULAR; INTRAVENOUS DAILY
Status: DISCONTINUED | OUTPATIENT
Start: 2025-06-26 | End: 2025-06-25 | Stop reason: HOSPADM

## 2025-06-25 RX ORDER — GUAIFENESIN 600 MG/1
600 TABLET, EXTENDED RELEASE ORAL 2 TIMES DAILY
Qty: 60 TABLET | Refills: 0 | Status: SHIPPED | OUTPATIENT
Start: 2025-06-25

## 2025-06-25 RX ORDER — FOLIC ACID 1 MG/1
1 TABLET ORAL DAILY
Qty: 30 TABLET | Refills: 3 | Status: SHIPPED | OUTPATIENT
Start: 2025-06-26

## 2025-06-25 RX ADMIN — IPRATROPIUM BROMIDE AND ALBUTEROL SULFATE 1 DOSE: 2.5; .5 SOLUTION RESPIRATORY (INHALATION) at 15:13

## 2025-06-25 RX ADMIN — BENZONATATE 200 MG: 100 CAPSULE ORAL at 08:53

## 2025-06-25 RX ADMIN — FOLIC ACID 1 MG: 1 TABLET ORAL at 08:55

## 2025-06-25 RX ADMIN — Medication 2 PUFF: at 07:52

## 2025-06-25 RX ADMIN — METHYLPREDNISOLONE SODIUM SUCCINATE 40 MG: 40 INJECTION, POWDER, LYOPHILIZED, FOR SOLUTION INTRAMUSCULAR; INTRAVENOUS at 12:45

## 2025-06-25 RX ADMIN — ACETAMINOPHEN 650 MG: 325 TABLET ORAL at 08:54

## 2025-06-25 RX ADMIN — FUROSEMIDE 40 MG: 10 INJECTION, SOLUTION INTRAMUSCULAR; INTRAVENOUS at 09:52

## 2025-06-25 RX ADMIN — GUAIFENESIN 600 MG: 600 TABLET, EXTENDED RELEASE ORAL at 08:53

## 2025-06-25 RX ADMIN — THIAMINE HCL TAB 100 MG 100 MG: 100 TAB at 08:55

## 2025-06-25 RX ADMIN — GUAIFENESIN AND DEXTROMETHORPHAN 10 ML: 100; 10 SYRUP ORAL at 08:54

## 2025-06-25 RX ADMIN — BENZONATATE 200 MG: 100 CAPSULE ORAL at 16:18

## 2025-06-25 RX ADMIN — THERA TABS 1 TABLET: TAB at 08:55

## 2025-06-25 RX ADMIN — SODIUM CHLORIDE, PRESERVATIVE FREE 10 ML: 5 INJECTION INTRAVENOUS at 08:53

## 2025-06-25 RX ADMIN — IPRATROPIUM BROMIDE AND ALBUTEROL SULFATE 1 DOSE: 2.5; .5 SOLUTION RESPIRATORY (INHALATION) at 07:52

## 2025-06-25 RX ADMIN — CHLORDIAZEPOXIDE HYDROCHLORIDE 25 MG: 10 CAPSULE ORAL at 08:53

## 2025-06-25 RX ADMIN — ENOXAPARIN SODIUM 40 MG: 100 INJECTION SUBCUTANEOUS at 08:54

## 2025-06-25 RX ADMIN — GUAIFENESIN AND DEXTROMETHORPHAN 10 ML: 100; 10 SYRUP ORAL at 16:18

## 2025-06-25 ASSESSMENT — PAIN SCALES - GENERAL
PAINLEVEL_OUTOF10: 8
PAINLEVEL_OUTOF10: 0

## 2025-06-25 ASSESSMENT — PAIN DESCRIPTION - LOCATION: LOCATION: HEAD

## 2025-06-25 ASSESSMENT — PAIN DESCRIPTION - DESCRIPTORS: DESCRIPTORS: THROBBING

## 2025-06-25 NOTE — PLAN OF CARE
Problem: Discharge Planning  Goal: Discharge to home or other facility with appropriate resources  6/25/2025 0904 by Mis Damon RN  Outcome: Progressing  6/25/2025 0001 by Mariama Ruggiero RN  Outcome: Progressing  Flowsheets (Taken 6/24/2025 1937)  Discharge to home or other facility with appropriate resources: Identify barriers to discharge with patient and caregiver     Problem: Seizure Precautions  Goal: Remains free of injury related to seizures activity  Outcome: Progressing     Problem: Pain  Goal: Verbalizes/displays adequate comfort level or baseline comfort level  6/25/2025 0904 by Mis Damon RN  Outcome: Progressing  6/25/2025 0001 by Mariama Ruggiero RN  Outcome: Progressing     Problem: Safety - Adult  Goal: Free from fall injury  6/25/2025 0904 by Mis Damon RN  Outcome: Progressing  6/25/2025 0001 by Mariama Ruggiero RN  Outcome: Progressing

## 2025-06-25 NOTE — CARE COORDINATION
1537: CM noted discharge order. Patient going home with [facility].    Transition of Care Plan:    RUR: 6%  Prior Level of Functioning: Independent  Disposition: Home with Hospice  NOAH: today  Follow up appointments: Per MD  DME needed: n/a  Transportation at discharge: spouse 5p  IM/IMM Medicare/ letter given: n/a hospice  Is patient a [facility] and connected with [facility]? N/a   If yes, was [facility] [place] transfer form completed and [facility] notified?   Caregiver Contact: spouse  Discharge Caregiver contacted prior to discharge? N/a  Care Conference needed? no  Barriers to discharge: none    1113: CM spoke to [facility], she requested recovery resources, CM emailed resources.    0809: CM reviewed chart. DCP home with [facility] when medicallly ready. CM will continue to follow.

## 2025-06-25 NOTE — PROGRESS NOTES
Pulmonology progress note    Subjective:     Patient seen and examined in his room on the floor this afternoon, no acute events overnight.  Saturating well on home 3 L nasal cannula, feels much better from a respiratory standpoint.  No new labs today.  Discussed with primary team, cleared for discharge from a pulmonary standpoint.                 Current Facility-Administered Medications   Medication Dose Route Frequency Provider Last Rate Last Admin    [START ON 6/26/2025] methylPREDNISolone sodium succ (SOLU-MEDROL) injection 40 mg  40 mg IntraVENous Daily Compa Moreno DO        benzonatate (TESSALON) capsule 200 mg  200 mg Oral TID Compa Moreno DO   200 mg at 06/25/25 0853    guaiFENesin-dextromethorphan (ROBITUSSIN DM) 100-10 MG/5ML syrup 10 mL  10 mL Oral TID Compa Moreno DO   10 mL at 06/25/25 0854    guaiFENesin (MUCINEX) extended release tablet 600 mg  600 mg Oral BID oCmpa Moreno DO   600 mg at 06/25/25 0853    chlordiazePOXIDE (LIBRIUM) capsule 25 mg  25 mg Oral BID Gt Sage MD   25 mg at 06/25/25 0853    ipratropium 0.5 mg-albuterol 2.5 mg (DUONEB) nebulizer solution 1 Dose  1 Dose Inhalation Q6H WA RT Compa Moreno DO   1 Dose at 06/25/25 1513    folic acid (FOLVITE) tablet 1 mg  1 mg Oral Daily Compa Moreno DO   1 mg at 06/25/25 0855    multivitamin 1 tablet  1 tablet Oral Daily Compa Moreno DO   1 tablet at 06/25/25 0855    budesonide-formoterol (SYMBICORT) 160-4.5 MCG/ACT inhaler 2 puff  2 puff Inhalation BID RT James Dallas MD   2 puff at 06/25/25 0752    sodium chloride flush 0.9 % injection 5-40 mL  5-40 mL IntraVENous 2 times per day James Dallas MD   10 mL at 06/25/25 0853    sodium chloride flush 0.9 % injection 5-40 mL  5-40 mL IntraVENous PRN James Dallas MD        0.9 % sodium chloride infusion   IntraVENous PRN James Dallas MD        potassium chloride (KLOR-CON M) extended release tablet 40 mEq  40 mEq Oral PRN James Dallas MD        Or    potassium  1901 - 06/25 0700  In: 240 [P.O.:240]  Out: 1400 [Urine:1400]    Physical Exam:   General appearance: alert, appears older than stated age, cooperative, and chronically ill-appearing.  Head: Normocephalic, without obvious abnormality, atraumatic  Eyes: negative  Neck: no adenopathy and supple, symmetrical, trachea midline  Lungs: Expiratory wheezing bilaterally, no obvious rhonchi, prolonged exhalation phase, currently on 3 L nasal cannula  Heart: regular rate and rhythm, S1, S2 normal, no murmur, click, rub or gallop  Abdomen: soft, non-tender; bowel sounds normal; no masses,  no organomegaly  Extremities: extremities normal, atraumatic, no cyanosis or edema  Pulses: 2+ and symmetric  Skin: Skin color, texture, turgor normal. No rashes or lesions  Lymph nodes: Cervical, supraclavicular, and axillary nodes normal.  Neurologic: Grossly normal, alert and oriented x 3, depressed mood    Additional comments:None    Lab/Data Review:    No results found for this or any previous visit (from the past 24 hours).      CTA chest (6/20/2025):  FINDINGS:  This is a good quality study for evaluation of pulmonary embolism to the first  subsegmental arterial level.     There are no filling defects within the opacified portions of the pulmonary  arterial tree.       The thoracic aorta is normal in caliber.  There is no pericardial effusion.   There is no significant lymphadenopathy.       There is no pleural effusion, pneumothorax, or airspace disease. There is  emphysema.     There are no acute fractures or aggressive appearing bony abnormalities.     IMPRESSION:  No evidence of pulmonary embolism. No other acute abnormalities.      Assessment:     Patient is a 70-year-old  male with a history of COPD, prior nicotine dependence, EtOH abuse, chronic hypoxemic respiratory failure on 2 to 3 L nasal cannula, and anxiety/depression who presented back to the hospital for acute on chronic hypoxia and an acute exacerbation of

## 2025-06-25 NOTE — DISCHARGE SUMMARY
.                                       Discharge Summary    Name: Yakov Moseley  [id number]  YOB: 1955 (Age: 70 y.o.)   Date of Admission: 6/20/2025  Date of Discharge: 6/25/2025  Attending Physician: Rosaline Carter MD    Discharge Diagnosis:   Acute on chronic hypoxemic respiratory failure  COPD exacerbation  Presumed community-acquired pneumonia  Alcohol withdrawal  Anxiety/depression/unspecified mood disorder  End-stage COPD  Home hospice    Consultations:  IP CONSULT TO SOCIAL WORK  IP CONSULT TO PULMONOLOGY  IP CONSULT TO CASE MANAGEMENT  IP CONSULT TO HOSPICE      Brief Hospital Course by Main Problems:   This is a 70-year-old male with past medical history of COPD, chronic hypoxic respiratory failure on 3 L of oxygen, heavy alcohol use, unspecified mood disorder/depression/anxiety, history of smoking comes into the ED for evaluation of shortness of breath admitted for acute on chronic paroxysmal respiratory failure secondary to COPD exacerbation.  CTA chest was done  no PE and no pneumonia.  Started on IV Solu-Medrol IV ceftriaxone and azithromycin.  Completed 5 days of IV ceftriaxone, 3 days of azithromycin for presumed CAP. Pulmonology was consulted.  Patient's respiratory status improved back to baseline.  Patient also had  alcohol withdrawal as he was found to have consumed large amounts of alcohol intake prior to presentation. Placed on CIWA protocol and on phenobarbital  tapering.  Patient was then placed on librium tapering, no more symptoms of withdrawal.  Patient remains medically stable to be discharged home with home hospice.  Pulmonology cleared as well.  Patient to follow-up with PCP and pulmonology after discharge    Discharge Exam:  Patient seen and examined by me on discharge day.  Pertinent Findings:  Patient Vitals for the past 24 hrs:   BP Temp Temp src Pulse Resp SpO2   06/25/25 1513 -- -- -- -- -- 98 %   06/25/25 0845 138/84 97.8 °F (36.6 °C) Oral 59 20 98 %   06/25/25  0752 -- -- -- 54 18 94 %   06/24/25 2321 135/83 97.7 °F (36.5 °C) Oral 59 18 96 %   06/24/25 1937 (!) 146/87 97.9 °F (36.6 °C) Axillary 58 18 94 %   06/24/25 1932 -- -- -- 62 18 97 %   06/24/25 1554 (!) 146/86 98.2 °F (36.8 °C) Oral 58 18 95 %       Gen:    Not in distress  Chest: Clear lungs  CVS:   Regular rhythm.  No edema  Abd:  Soft, not distended, not tender  Neuro:  Aax 4     Discharge/Recent Laboratory Results:  Recent Labs     06/24/25  0506      K 4.0      CO2 30   BUN 18   CREATININE 0.76   GLUCOSE 106*   CALCIUM 8.6   PHOS 2.3*   MG 1.8     Recent Labs     06/24/25  0506   HGB 11.5*   HCT 34.5*   WBC 8.3          Discharge Medications:     Medication List        START taking these medications      folic acid 1 MG tablet  Commonly known as: FOLVITE  Take 1 tablet by mouth daily  Start taking on: June 26, 2025     guaiFENesin 600 MG extended release tablet  Commonly known as: MUCINEX  Take 1 tablet by mouth 2 times daily     multivitamin Tabs tablet  Take 1 tablet by mouth daily  Start taking on: June 26, 2025            CONTINUE taking these medications      Angely Aerosphere 160-9-4.8 MCG/ACT Aero  Generic drug: Budeson-Glycopyrrol-Formoterol               Where to Get Your Medications        These medications were sent to [id number] - [place], [place] - [address] [phone] - F [phone]  [address] [address]      Phone: [phone]   folic acid 1 MG tablet  guaiFENesin 600 MG extended release tablet  multivitamin Tabs tablet             DISPOSITION:    Home with Family:       Home with HH/PT/OT/RN:    SNF/LTC:    CHRIS:    OTHER: Home hospice           Code status: Full   Recommended diet: cardiac diet  Recommended activity: activity as tolerated  Wound care: None      Follow up with:   PCP : Lenard Sams MD Murney, Sean,   [address] [place] [id number] [phone]    Call  follow up after hospital DC for  COPD    Lenard Sams MD  [address]  [address]  [facility] [address]  [phone]    Call  Follow up after hospital DC for anxiety and depression          Total time in minutes spent coordinating this discharge (includes going over instructions, follow-up, prescriptions, and preparing report for sign off to her PCP) :  35 minutes

## 2025-06-25 NOTE — PROGRESS NOTES
4 Eyes Skin Assessment     NAME:  Yakov Moseley  YOB: 1955  MEDICAL RECORD NUMBER:  [id number]    The patient is being assessed for  Other weekly    I agree that at least one RN has performed a thorough Head to Toe Skin Assessment on the patient. ALL assessment sites listed below have been assessed.      Areas assessed by both nurses:    Head, Face, Ears, Shoulders, Back, Chest, Arms, Elbows, Hands, Sacrum. Buttock, Coccyx, Ischium, Legs. Feet and Heels, and Under Medical Devices         Does the Patient have a Wound? No noted wound(s)       Javier Prevention initiated by RN: No  Wound Care Orders initiated by RN: No    Pressure Injury (Stage 3,4, Unstageable, DTI, NWPT, and Complex wounds) if present, place Wound referral order by RN under : No    New Ostomies, if present place, Ostomy referral order under : No     Nurse 1 eSignature: Electronically signed by Mis Damon RN on 6/25/25 at 9:03 AM EDT    **SHARE this note so that the co-signing nurse can place an eSignature**    Nurse 2 eSignature: {Esignature:[id number]}

## 2025-06-25 NOTE — PLAN OF CARE
Problem: Discharge Planning  Goal: Discharge to home or other facility with appropriate resources  6/25/2025 0001 by Mariama Ruggiero RN  Outcome: Progressing  Flowsheets (Taken 6/24/2025 1937)  Discharge to home or other facility with appropriate resources: Identify barriers to discharge with patient and caregiver     Problem: Pain  Goal: Verbalizes/displays adequate comfort level or baseline comfort level  6/25/2025 0001 by Mariama Ruggiero, RN  Outcome: Progressing     Problem: Safety - Adult  Goal: Free from fall injury  6/25/2025 0001 by Mariama Ruggiero, RN  Outcome: Progressing

## 2025-06-27 LAB
BACTERIA SPEC CULT: NORMAL
BACTERIA SPEC CULT: NORMAL
Lab: NORMAL
Lab: NORMAL

## 2025-06-27 NOTE — PROGRESS NOTES
Physician Progress Note      PATIENT:               PHUC SALEH  CSN #:                  [id number]  :                       1955  ADMIT DATE:       2025 7:41 PM  DISCH DATE:        2025 5:26 PM  RESPONDING  PROVIDER #:        Rosaline Carter MD          QUERY TEXT:    community-acquired pneumonia is documented in the medical record DS  ,CTA   without evidence of pneumonia or PE. Please provide additional clinical   indicators supportive of your documentation. Or please document if the   diagnosis of community-acquired pneumonia has been ruled out after study.    The clinical indicators include:  --COPD, Respiratory failure  -- H&P  Hypoxia 2/2 COPD exacerbation on 3L O2 at home.  -- Pulmonology CN  Acute on chronic hypoxemic respiratory failure-   Secondary to acute exacerbation of COPD  --CTA without evidence of pneumonia or PE.  --X-Ray chest  No acute intrathoracic process is identified.  --DS   Presumed community-acquired pneumonia, CTA chest was done  no PE   and no pneumonia.  Started on IV Solu-Medrol IV ceftriaxone and azithromycin.    Completed 5 days of IV ceftriaxone, 3 days of azithromycin for presumed CAP.  --Pulmonology consult, X-Ray,  IV ceftriaxone 5 days, 3 days of azithromycin,   X-Ray    Thank You  Dorothy Varela S CDS  Options provided:  -- community-acquired pneumonia present as evidenced by, Please document   evidence.  -- community-acquired pneumonia was ruled out after study  -- Other - I will add my own diagnosis  -- Disagree - Not applicable / Not valid  -- Disagree - Clinically unable to determine / Unknown  -- Refer to Clinical Documentation Reviewer    PROVIDER RESPONSE TEXT:    Presumed community-acquired pneumonia    Query created by: Dorothy Rodriguez on 2025 6:48 AM      Electronically signed by:  Rosaline Carter MD 2025 7:27 AM

## (undated) DEVICE — GARMENT,MEDLINE,DVT,INT,CALF,MED, GEN2: Brand: MEDLINE

## (undated) DEVICE — INTENDED FOR TISSUE SEPARATION, AND OTHER PROCEDURES THAT REQUIRE A SHARP SURGICAL BLADE TO PUNCTURE OR CUT.: Brand: BARD-PARKER ® CARBON RIB-BACK BLADES

## (undated) DEVICE — LAMINECTOMY ARM CRADLE FOAM POSITIONER: Brand: CARDINAL HEALTH

## (undated) DEVICE — GLOVE ORANGE PI 7 1/2   MSG9075

## (undated) DEVICE — BLADE ES L6IN ELASTOMERIC COAT EXT DURABLE BEND UPTO 90DEG

## (undated) DEVICE — DRAPE,U/ SHT,SPLIT,PLAS,STERIL: Brand: MEDLINE

## (undated) DEVICE — DRAPE,ISOLATION,INCISE,INVISISHIELD: Brand: MEDLINE

## (undated) DEVICE — DRILL, AO, STERILE

## (undated) DEVICE — SUTURE VCRL + SZ 1 L27IN ABSRB UD OS 6 L36MM 1 2 CIR REV CUT VCP535H

## (undated) DEVICE — SOLUTION IRRIG 1000ML 0.9% SOD CHL USP POUR PLAS BTL

## (undated) DEVICE — GLOVE SURG SZ 8 L12IN FNGR THK79MIL GRN LTX FREE

## (undated) DEVICE — BASIC SINGLE BASIN-LF: Brand: MEDLINE INDUSTRIES, INC.

## (undated) DEVICE — MAJOR ORTHO PROCEDURE PACK: Brand: MEDLINE INDUSTRIES, INC.

## (undated) DEVICE — SOLUTION SURG PREP 26 CC PURPREP

## (undated) DEVICE — REAMER SHAFT, MOD.TRINKLE: Brand: BIXCUT

## (undated) DEVICE — SOUTHSIDE TURNOVER: Brand: MEDLINE INDUSTRIES, INC.

## (undated) DEVICE — TAPE,CLOTH/SILK,CURAD,3"X10YD,LF,40/CS: Brand: CURAD

## (undated) DEVICE — K-WIRE

## (undated) DEVICE — 3M™ TEGADERM™ TRANSPARENT FILM DRESSING FRAME STYLE, 1629, 8 IN X 12 IN (20 CM X 30 CM), 10/CT 8CT/CASE: Brand: 3M™ TEGADERM™

## (undated) DEVICE — SUTURE VCRL + SZ 2-0 L27IN ABSRB UD CP-1 1/2 CIR REV CUT VCP266H

## (undated) DEVICE — GUIDE WIRE, BALL-TIPPED, STERILE